# Patient Record
Sex: MALE | Race: BLACK OR AFRICAN AMERICAN | NOT HISPANIC OR LATINO
[De-identification: names, ages, dates, MRNs, and addresses within clinical notes are randomized per-mention and may not be internally consistent; named-entity substitution may affect disease eponyms.]

---

## 2018-03-08 ENCOUNTER — RESULT REVIEW (OUTPATIENT)
Age: 58
End: 2018-03-08

## 2019-03-14 ENCOUNTER — EMERGENCY (EMERGENCY)
Facility: HOSPITAL | Age: 59
LOS: 1 days | Discharge: HOME | End: 2019-03-14
Attending: EMERGENCY MEDICINE
Payer: COMMERCIAL

## 2019-03-14 VITALS
SYSTOLIC BLOOD PRESSURE: 149 MMHG | HEART RATE: 98 BPM | OXYGEN SATURATION: 98 % | RESPIRATION RATE: 20 BRPM | DIASTOLIC BLOOD PRESSURE: 91 MMHG | TEMPERATURE: 101 F

## 2019-03-14 PROCEDURE — 99284 EMERGENCY DEPT VISIT MOD MDM: CPT

## 2019-03-14 RX ORDER — ONDANSETRON 8 MG/1
4 TABLET, FILM COATED ORAL ONCE
Qty: 0 | Refills: 0 | Status: COMPLETED | OUTPATIENT
Start: 2019-03-14 | End: 2019-03-14

## 2019-03-14 RX ORDER — SODIUM CHLORIDE 9 MG/ML
1000 INJECTION INTRAMUSCULAR; INTRAVENOUS; SUBCUTANEOUS ONCE
Qty: 0 | Refills: 0 | Status: COMPLETED | OUTPATIENT
Start: 2019-03-14 | End: 2019-03-14

## 2019-03-14 RX ORDER — ACETAMINOPHEN 500 MG
975 TABLET ORAL ONCE
Qty: 0 | Refills: 0 | Status: COMPLETED | OUTPATIENT
Start: 2019-03-14 | End: 2019-03-14

## 2019-03-14 NOTE — ED ADULT TRIAGE NOTE - CHIEF COMPLAINT QUOTE
fever 102 pt refused any meds offered by family member fever 102 pt refused any meds offered by family member, pt laid down on the floor while waiting, able to ambulate w/o difficulty at triage

## 2019-03-15 VITALS
RESPIRATION RATE: 19 BRPM | HEART RATE: 85 BPM | SYSTOLIC BLOOD PRESSURE: 113 MMHG | TEMPERATURE: 99 F | OXYGEN SATURATION: 98 % | DIASTOLIC BLOOD PRESSURE: 67 MMHG

## 2019-03-15 DIAGNOSIS — Z98.890 OTHER SPECIFIED POSTPROCEDURAL STATES: Chronic | ICD-10-CM

## 2019-03-15 LAB
ALBUMIN SERPL ELPH-MCNC: 4.2 G/DL — SIGNIFICANT CHANGE UP (ref 3.3–5)
ALP SERPL-CCNC: 68 U/L — SIGNIFICANT CHANGE UP (ref 40–120)
ALT FLD-CCNC: 24 U/L — SIGNIFICANT CHANGE UP (ref 10–45)
ANION GAP SERPL CALC-SCNC: 12 MMOL/L — SIGNIFICANT CHANGE UP (ref 5–17)
AST SERPL-CCNC: 22 U/L — SIGNIFICANT CHANGE UP (ref 10–40)
BASOPHILS # BLD AUTO: 0 K/UL — SIGNIFICANT CHANGE UP (ref 0–0.2)
BASOPHILS NFR BLD AUTO: 0.1 % — SIGNIFICANT CHANGE UP (ref 0–2)
BILIRUB SERPL-MCNC: 0.4 MG/DL — SIGNIFICANT CHANGE UP (ref 0.2–1.2)
BUN SERPL-MCNC: 15 MG/DL — SIGNIFICANT CHANGE UP (ref 7–23)
CALCIUM SERPL-MCNC: 9.4 MG/DL — SIGNIFICANT CHANGE UP (ref 8.4–10.5)
CHLORIDE SERPL-SCNC: 100 MMOL/L — SIGNIFICANT CHANGE UP (ref 96–108)
CO2 SERPL-SCNC: 25 MMOL/L — SIGNIFICANT CHANGE UP (ref 22–31)
CREAT SERPL-MCNC: 1.11 MG/DL — SIGNIFICANT CHANGE UP (ref 0.5–1.3)
EOSINOPHIL # BLD AUTO: 0.1 K/UL — SIGNIFICANT CHANGE UP (ref 0–0.5)
EOSINOPHIL NFR BLD AUTO: 1.8 % — SIGNIFICANT CHANGE UP (ref 0–6)
GLUCOSE SERPL-MCNC: 94 MG/DL — SIGNIFICANT CHANGE UP (ref 70–99)
HCT VFR BLD CALC: 40.8 % — SIGNIFICANT CHANGE UP (ref 39–50)
HGB BLD-MCNC: 13.4 G/DL — SIGNIFICANT CHANGE UP (ref 13–17)
LYMPHOCYTES # BLD AUTO: 0.6 K/UL — LOW (ref 1–3.3)
LYMPHOCYTES # BLD AUTO: 7.8 % — LOW (ref 13–44)
MCHC RBC-ENTMCNC: 29.5 PG — SIGNIFICANT CHANGE UP (ref 27–34)
MCHC RBC-ENTMCNC: 32.9 GM/DL — SIGNIFICANT CHANGE UP (ref 32–36)
MCV RBC AUTO: 89.9 FL — SIGNIFICANT CHANGE UP (ref 80–100)
MONOCYTES # BLD AUTO: 0.5 K/UL — SIGNIFICANT CHANGE UP (ref 0–0.9)
MONOCYTES NFR BLD AUTO: 7.1 % — SIGNIFICANT CHANGE UP (ref 2–14)
NEUTROPHILS # BLD AUTO: 6 K/UL — SIGNIFICANT CHANGE UP (ref 1.8–7.4)
NEUTROPHILS NFR BLD AUTO: 83.2 % — HIGH (ref 43–77)
PLATELET # BLD AUTO: 255 K/UL — SIGNIFICANT CHANGE UP (ref 150–400)
POTASSIUM SERPL-MCNC: 4.1 MMOL/L — SIGNIFICANT CHANGE UP (ref 3.5–5.3)
POTASSIUM SERPL-SCNC: 4.1 MMOL/L — SIGNIFICANT CHANGE UP (ref 3.5–5.3)
PROT SERPL-MCNC: 7.1 G/DL — SIGNIFICANT CHANGE UP (ref 6–8.3)
RBC # BLD: 4.54 M/UL — SIGNIFICANT CHANGE UP (ref 4.2–5.8)
RBC # FLD: 13.5 % — SIGNIFICANT CHANGE UP (ref 10.3–14.5)
SODIUM SERPL-SCNC: 137 MMOL/L — SIGNIFICANT CHANGE UP (ref 135–145)
WBC # BLD: 7.2 K/UL — SIGNIFICANT CHANGE UP (ref 3.8–10.5)
WBC # FLD AUTO: 7.2 K/UL — SIGNIFICANT CHANGE UP (ref 3.8–10.5)

## 2019-03-15 PROCEDURE — 85027 COMPLETE CBC AUTOMATED: CPT

## 2019-03-15 PROCEDURE — 99284 EMERGENCY DEPT VISIT MOD MDM: CPT | Mod: 25

## 2019-03-15 PROCEDURE — 96374 THER/PROPH/DIAG INJ IV PUSH: CPT

## 2019-03-15 PROCEDURE — 80053 COMPREHEN METABOLIC PANEL: CPT

## 2019-03-15 RX ADMIN — SODIUM CHLORIDE 2000 MILLILITER(S): 9 INJECTION INTRAMUSCULAR; INTRAVENOUS; SUBCUTANEOUS at 00:08

## 2019-03-15 RX ADMIN — ONDANSETRON 4 MILLIGRAM(S): 8 TABLET, FILM COATED ORAL at 00:08

## 2019-03-15 RX ADMIN — Medication 975 MILLIGRAM(S): at 00:08

## 2019-03-15 NOTE — ED ADULT NURSE NOTE - OBJECTIVE STATEMENT
59 yr old male arrived to the ED from home c/o fever. HY. prostate CA, HTN, perforated diverticulitis w/ colostomy. 59 yr old male arrived to the ED from home c/o fever. HY. prostate CA, HTN, perforated diverticulitis w/ colostomy. per pt he has had a fever x12 with vomiting. pt endorses feeling weak and not eating or drinking. upon assessment pt is lethargic, but awake, refusing to lift his arm for blood pressure cuff application. lungs clear cassidy, abd is soft, non tender. skin WDL. wife at bedside

## 2019-03-15 NOTE — ED PROVIDER NOTE - NSFOLLOWUPINSTRUCTIONS_ED_ALL_ED_FT
Please follow-up with your primary doctor in 2-3 days, as needed      Fever  A fever is an increase in the body's temperature above 100.4°F (38°C) or higher. In adults and children older than three months, a brief mild or moderate fever generally has no long-term effect, and it usually does not require treatment. Many times, fevers are the result of viral infections, which are self-resolving.  However, certain symptoms or diagnostic tests may suggest a bacterial infection that may respond to antibiotics. Take medications as directed by your health care provider.    SEEK IMMEDIATE MEDICAL CARE IF YOU OR YOUR CHILD HAVE ANY OF THE FOLLOWING SYMPTOMS : shortness of breath, seizure, rash/stiff neck/headache, severe abdominal pain, persistent vomiting, any signs of dehydration, or if your child has a fever for over five (5) days.         Viral Respiratory Infection  A viral respiratory infection is an illness that affects parts of the body used for breathing, like the lungs, nose, and throat. It is caused by a germ called a virus. Symptoms can include runny nose, coughing, sneezing, fatigue, body aches, sore throat, fever, or headache. Over the counter medicine can be used to manage the symptoms but the infection typically goes away on its own in 5 to 10 days.     SEEK IMMEDIATE MEDICAL CARE IF YOU HAVE ANY OF THE FOLLOWING SYMPTOMS: shortness of breath, chest pain, fever over 10 days, or lightheadedness/dizziness.

## 2019-03-15 NOTE — ED PROVIDER NOTE - CLINICAL SUMMARY MEDICAL DECISION MAKING FREE TEXT BOX
[facility]averty PGY1- likely viral syndrome, ivf, tylenol, zofran, check basic labs,   PO challenge and d/c after meds Haverty PGY1- likely viral syndrome, ivf, tylenol, zofran, check basic labs,   PO challenge and d/c after meds    Angelina Ch MD - Attending Physician: Pt here with fever, myalgias. Likely viral syndrome. Exam benign. IVF, tylenol, recheck

## 2019-03-15 NOTE — ED PROVIDER NOTE - ATTENDING CONTRIBUTION TO CARE
Angelina Ch MD - Attending Physician: I have personally seen and examined this patient with the resident/fellow.  I have fully participated in the care of this patient. I have reviewed all pertinent clinical information, including history, physical exam, plan and the Resident/Fellow’s note and agree except as noted. See MDM

## 2019-03-15 NOTE — ED PROVIDER NOTE - PHYSICAL EXAMINATION
PHYSICAL EXAM:  GENERAL: midl distress, well-groomed, well-developed, conversant  HEAD:  Atraumatic, Normocephalic  EYES: EOMI, PERRLA, conjunctiva and sclera clear  ENMT: No tonsillar erythema, exudates, or enlargement; dry mucous membranes  NECK: Supple, No JVD, Normal thyroid  HEART: Regular rate and rhythm; No murmurs, rubs, or gallops  RESPIRATORY: CTA B/L, No W/R/R  ABDOMEN: Soft, Nontender, Nondistended, midline abdominal scar  NEURO: A&Ox3, nonfocal, moving all extremities  EXTREMITIES:  2+ Peripheral Pulses, No clubbing, cyanosis, or edema  SKIN: warm, dry, normal color, no rash or abnormal lesions

## 2019-03-15 NOTE — ED ADULT NURSE NOTE - CHIEF COMPLAINT QUOTE
fever 102 pt refused any meds offered by family member, pt laid down on the floor while waiting, able to ambulate w/o difficulty at triage

## 2019-03-15 NOTE — ED PROVIDER NOTE - OBJECTIVE STATEMENT
59 yoM, PMHx of HTN, prostate CA treated with cyber knife 2018, perfed diverticulitis with colostomy, s/p take down presents with 12 hours of fever, weakness, N/V, decreased PO and headache. Has not taken any Tylenol or ibuprofen at home for pain. No diarrhea or abd pain. No cough or recent illness. Denies vertigo but states when stood felt "off balance." Has lied in bed most day. No CP/SOB. Every day smoker. Social Etoh.

## 2019-08-13 ENCOUNTER — APPOINTMENT (OUTPATIENT)
Dept: SPINE | Facility: CLINIC | Age: 59
End: 2019-08-13
Payer: COMMERCIAL

## 2019-08-13 VITALS
WEIGHT: 205 LBS | DIASTOLIC BLOOD PRESSURE: 70 MMHG | HEIGHT: 73 IN | SYSTOLIC BLOOD PRESSURE: 109 MMHG | BODY MASS INDEX: 27.17 KG/M2 | HEART RATE: 71 BPM

## 2019-08-13 DIAGNOSIS — Z82.49 FAMILY HISTORY OF ISCHEMIC HEART DISEASE AND OTHER DISEASES OF THE CIRCULATORY SYSTEM: ICD-10-CM

## 2019-08-13 DIAGNOSIS — Z85.9 PERSONAL HISTORY OF MALIGNANT NEOPLASM, UNSPECIFIED: ICD-10-CM

## 2019-08-13 DIAGNOSIS — Z86.79 PERSONAL HISTORY OF OTHER DISEASES OF THE CIRCULATORY SYSTEM: ICD-10-CM

## 2019-08-13 DIAGNOSIS — Z78.9 OTHER SPECIFIED HEALTH STATUS: ICD-10-CM

## 2019-08-13 DIAGNOSIS — F17.200 NICOTINE DEPENDENCE, UNSPECIFIED, UNCOMPLICATED: ICD-10-CM

## 2019-08-13 PROCEDURE — 99244 OFF/OP CNSLTJ NEW/EST MOD 40: CPT

## 2019-08-13 RX ORDER — OMEGA-3/DHA/EPA/FISH OIL 300-1000MG
1000 CAPSULE ORAL
Refills: 0 | Status: ACTIVE | COMMUNITY

## 2019-08-13 RX ORDER — AMLODIPINE BESYLATE 5 MG/1
5 TABLET ORAL
Refills: 0 | Status: ACTIVE | COMMUNITY

## 2019-08-13 RX ORDER — MULTIVIT-MIN/FOLIC/VIT K/LYCOP 400-300MCG
50 MCG TABLET ORAL
Refills: 0 | Status: ACTIVE | COMMUNITY

## 2019-08-13 RX ORDER — LOSARTAN POTASSIUM 100 MG/1
100 TABLET, FILM COATED ORAL
Refills: 0 | Status: ACTIVE | COMMUNITY

## 2019-08-13 RX ORDER — FEXOFENADINE HYDROCHLORIDE AND PSEUDOEPHEDRINE HYDROCHLORIDE 180; 240 MG/1; MG/1
TABLET, FILM COATED, EXTENDED RELEASE ORAL
Refills: 0 | Status: ACTIVE | COMMUNITY

## 2019-08-13 RX ORDER — ASCORBIC ACID 500 MG
500 TABLET ORAL
Refills: 0 | Status: ACTIVE | COMMUNITY

## 2019-08-19 ENCOUNTER — RX RENEWAL (OUTPATIENT)
Age: 59
End: 2019-08-19

## 2019-08-21 ENCOUNTER — APPOINTMENT (OUTPATIENT)
Dept: OTOLARYNGOLOGY | Facility: CLINIC | Age: 59
End: 2019-08-21
Payer: COMMERCIAL

## 2019-08-21 VITALS
WEIGHT: 205 LBS | BODY MASS INDEX: 27.17 KG/M2 | HEART RATE: 71 BPM | HEIGHT: 73 IN | DIASTOLIC BLOOD PRESSURE: 67 MMHG | SYSTOLIC BLOOD PRESSURE: 107 MMHG

## 2019-08-21 PROCEDURE — 31231 NASAL ENDOSCOPY DX: CPT

## 2019-08-21 PROCEDURE — 99204 OFFICE O/P NEW MOD 45 MIN: CPT | Mod: 25

## 2019-08-21 NOTE — ASSESSMENT
[FreeTextEntry1] : Pituitary Macroadenoma:\par - discussed my role in the surgery including the nasal and sinus procedures that I will be performing in order to provide the neurosurgeon access to the pituitary tumor\par - expected post-op hospital course discussed including need for possible repair of spinal fluid leak and close observation for 2-3 days\par - post-op care consisting of nasal saline irrigations was reviewed, and [facility] sinus pamphlet was given to patient to obtain prior to surgery so they have ready to use on discharge home\par - discussed post-operative issues including nasal congestion, loss of sense of smell which should be temporary but can be permanent, bloody nasal drainage, facial pressure/headaches, bleeding, and infection\par - all questions answered and patient will call if any further concerns\par - plan to see patient on AM of surgery \par \par LPRD:\par - recommend reflux precautions and H2 blocker at night\par \par Chronic maxillary sinusitis:\par - opacification noted on PET 2018 and some sinus disease noted on MRI 7/2019 so will view sinuses on MRI he is having preop and will address other sinuses at time of surgery if needed

## 2019-08-21 NOTE — REASON FOR VISIT
[Initial Consultation] : an initial consultation for [FreeTextEntry2] : Going for TSPR with Dr. Rodriguez 9/26/19

## 2019-08-21 NOTE — CONSULT LETTER
[Dear  ___] : Dear  [unfilled], [Consult Letter:] : I had the pleasure of evaluating your patient, [unfilled]. [Consult Closing:] : Thank you very much for allowing me to participate in the care of this patient.  If you have any questions, please do not hesitate to contact me. [Please see my note below.] : Please see my note below. [Sincerely,] : Sincerely, [FreeTextEntry3] : Beulah Romeo MD\par Otolaryngology and Cranial Base Surgery\par Attending Physician - Department of Otolaryngology and Head & Neck Surgery\par [facility]\par  - [employer] [facility]\par Office: [phone]\par Fax: [phone]\par

## 2019-08-21 NOTE — PROCEDURE
[FreeTextEntry6] : Afrin and lidocaine were topically sprayed. Flexible scope #2 was used. Right nasal passage with normal inferior, middle and superior turbinates. Nasal passage narrowed by septal spur and unable to pass scope further back. Left nasal passage with normal inferior, middle and superior turbinates. Nasal passage was narrowed by septal deviation with clear middle meatus and sphenoethmoid recess. No mucopurulence or polyps appreciated. Nasopharynx clear.  [de-identified] : Afrin 0.05% and lidocaine 4% sprayed into both nasal passages. Flexible scope #2 used. Passed through nasal passage and nasopharynx/oropharynx/hypopharynx clear. Supraglottis normal. Glottis with fully mobile vocal cords without lesions or masses. Visualized subglottis clear. Postcricoid area with mild erythema but no edema. No pooling of secretions.

## 2019-08-21 NOTE — PHYSICAL EXAM
[Midline] : trachea located in midline position [Normal] : no rashes [de-identified] : well healed right parotid incision scar

## 2019-09-12 ENCOUNTER — OUTPATIENT (OUTPATIENT)
Dept: OUTPATIENT SERVICES | Facility: HOSPITAL | Age: 59
LOS: 1 days | End: 2019-09-12
Payer: COMMERCIAL

## 2019-09-12 VITALS
RESPIRATION RATE: 16 BRPM | SYSTOLIC BLOOD PRESSURE: 112 MMHG | HEIGHT: 73 IN | TEMPERATURE: 98 F | OXYGEN SATURATION: 98 % | HEART RATE: 67 BPM | DIASTOLIC BLOOD PRESSURE: 63 MMHG | WEIGHT: 207.9 LBS

## 2019-09-12 DIAGNOSIS — Z87.828 PERSONAL HISTORY OF OTHER (HEALED) PHYSICAL INJURY AND TRAUMA: Chronic | ICD-10-CM

## 2019-09-12 DIAGNOSIS — D35.2 BENIGN NEOPLASM OF PITUITARY GLAND: ICD-10-CM

## 2019-09-12 DIAGNOSIS — I10 ESSENTIAL (PRIMARY) HYPERTENSION: ICD-10-CM

## 2019-09-12 DIAGNOSIS — Z98.890 OTHER SPECIFIED POSTPROCEDURAL STATES: Chronic | ICD-10-CM

## 2019-09-12 DIAGNOSIS — D11.0 BENIGN NEOPLASM OF PAROTID GLAND: Chronic | ICD-10-CM

## 2019-09-12 DIAGNOSIS — Z29.9 ENCOUNTER FOR PROPHYLACTIC MEASURES, UNSPECIFIED: ICD-10-CM

## 2019-09-12 DIAGNOSIS — D49.4 NEOPLASM OF UNSPECIFIED BEHAVIOR OF BLADDER: Chronic | ICD-10-CM

## 2019-09-12 DIAGNOSIS — G47.30 SLEEP APNEA, UNSPECIFIED: ICD-10-CM

## 2019-09-12 DIAGNOSIS — Z01.818 ENCOUNTER FOR OTHER PREPROCEDURAL EXAMINATION: ICD-10-CM

## 2019-09-12 LAB
ANION GAP SERPL CALC-SCNC: 13 MMOL/L — SIGNIFICANT CHANGE UP (ref 5–17)
BUN SERPL-MCNC: 9 MG/DL — SIGNIFICANT CHANGE UP (ref 7–23)
CALCIUM SERPL-MCNC: 9.3 MG/DL — SIGNIFICANT CHANGE UP (ref 8.4–10.5)
CHLORIDE SERPL-SCNC: 104 MMOL/L — SIGNIFICANT CHANGE UP (ref 96–108)
CO2 SERPL-SCNC: 24 MMOL/L — SIGNIFICANT CHANGE UP (ref 22–31)
CREAT SERPL-MCNC: 0.92 MG/DL — SIGNIFICANT CHANGE UP (ref 0.5–1.3)
GLUCOSE SERPL-MCNC: 80 MG/DL — SIGNIFICANT CHANGE UP (ref 70–99)
HCT VFR BLD CALC: 36.7 % — LOW (ref 39–50)
HGB BLD-MCNC: 11.8 G/DL — LOW (ref 13–17)
MCHC RBC-ENTMCNC: 28.9 PG — SIGNIFICANT CHANGE UP (ref 27–34)
MCHC RBC-ENTMCNC: 32.2 GM/DL — SIGNIFICANT CHANGE UP (ref 32–36)
MCV RBC AUTO: 89.7 FL — SIGNIFICANT CHANGE UP (ref 80–100)
PLATELET # BLD AUTO: 313 K/UL — SIGNIFICANT CHANGE UP (ref 150–400)
POTASSIUM SERPL-MCNC: 4.1 MMOL/L — SIGNIFICANT CHANGE UP (ref 3.5–5.3)
POTASSIUM SERPL-SCNC: 4.1 MMOL/L — SIGNIFICANT CHANGE UP (ref 3.5–5.3)
RBC # BLD: 4.09 M/UL — LOW (ref 4.2–5.8)
RBC # FLD: 14.1 % — SIGNIFICANT CHANGE UP (ref 10.3–14.5)
SODIUM SERPL-SCNC: 141 MMOL/L — SIGNIFICANT CHANGE UP (ref 135–145)
WBC # BLD: 4.86 K/UL — SIGNIFICANT CHANGE UP (ref 3.8–10.5)
WBC # FLD AUTO: 4.86 K/UL — SIGNIFICANT CHANGE UP (ref 3.8–10.5)

## 2019-09-12 PROCEDURE — 80048 BASIC METABOLIC PNL TOTAL CA: CPT

## 2019-09-12 PROCEDURE — G0463: CPT

## 2019-09-12 PROCEDURE — 85027 COMPLETE CBC AUTOMATED: CPT

## 2019-09-12 RX ORDER — CEFAZOLIN SODIUM 1 G
2000 VIAL (EA) INJECTION ONCE
Refills: 0 | Status: DISCONTINUED | OUTPATIENT
Start: 2019-09-26 | End: 2019-09-27

## 2019-09-12 NOTE — H&P PST ADULT - GASTROINTESTINAL COMMENTS
Surgical scar well healed abdominal area lower hx constipation chronic hx diverticulitis has colon resection 2008 been stable since

## 2019-09-12 NOTE — H&P PST ADULT - NSICDXPASTSURGICALHX_GEN_ALL_CORE_FT
PAST SURGICAL HISTORY:  Benign parotid tumor excision 2009    Bladder tumor excision  2014    H/O arthroscopy of knee right knee 1995    History of colostomy reversal 2009 /colon resection 2008    History of torn meniscus of left knee 1995

## 2019-09-12 NOTE — H&P PST ADULT - NSICDXPROBLEM_GEN_ALL_CORE_FT
PROBLEM DIAGNOSES  Problem: Benign neoplasm of pituitary gland  Assessment and Plan: Endoscopic transphenoidal removal of pituitary tumor, PSt instruction given , CBC/BMP drawn sent pending result has own appointment with PMD next week for preop medical evaluation    Problem: Sleep apnea  Assessment and Plan: OR booking called for MATEO precaution    Problem: Prophylactic measure  Assessment and Plan: The Caprini score indicates this patient is at risk for a VTE event (score 3-5).  Most surgical patients in this group would benefit from pharmacologic prophylaxis.  The surgical team will determine the balance between VTE risk and bleeding risk      Problem: Hypertension  Assessment and Plan: to continue taking blood pressure medication regularly and on DOS   Patient on asa for prophylaxis will d/c asa/gingko  7 days preop as instructed by PMD PROBLEM DIAGNOSES  Problem: Benign neoplasm of pituitary gland  Assessment and Plan: Endoscopic transphenoidal removal of pituitary tumor, PSt instruction given , CBC/BMP drawn sent pending result has own appointment with PMD next week for preop medical evaluation    Problem: Sleep apnea  Assessment and Plan: OR booking called for MATEO precaution will bring own oral device     Problem: Prophylactic measure  Assessment and Plan: The Caprini score indicates this patient is at risk for a VTE event (score 3-5).  Most surgical patients in this group would benefit from pharmacologic prophylaxis.  The surgical team will determine the balance between VTE risk and bleeding risk      Problem: Hypertension  Assessment and Plan: to continue taking blood pressure medication regularly and on DOS   Patient on asa for prophylaxis will d/c asa/gingko  7 days preop as instructed by PMD

## 2019-09-12 NOTE — H&P PST ADULT - ASSESSMENT
CAPRINI SCORE [CLOT]    AGE RELATED RISK FACTORS                                                       MOBILITY RELATED FACTORS  [x ] Age 41-60 years                                            (1 Point)                  [ ] Bed rest                                                        (1 Point)  [ ] Age: 61-74 years                                           (2 Points)                 [ ] Plaster cast                                                   (2 Points)  [ ] Age= 75 years                                              (3 Points)                 [ ] Bed bound for more than 72 hours                 (2 Points)    DISEASE RELATED RISK FACTORS                                               GENDER SPECIFIC FACTORS  [ ] Edema in the lower extremities                       (1 Point)                  [ ] Pregnancy                                                     (1 Point)  [ ] Varicose veins                                               (1 Point)                  [ ] Post-partum < 6 weeks                                   (1 Point)             [ x] BMI > 25 Kg/m2                                            (1 Point)                  [ ] Hormonal therapy  or oral contraception          (1 Point)                 [ ] Sepsis (in the previous month)                        (1 Point)                  [ ] History of pregnancy complications                 (1 point)  [ ] Pneumonia or serious lung disease                                               [ ] Unexplained or recurrent                     (1 Point)           (in the previous month)                               (1 Point)  [ ] Abnormal pulmonary function test                     (1 Point)                 SURGERY RELATED RISK FACTORS  [ ] Acute myocardial infarction                              (1 Point)                 [ ]  Section                                             (1 Point)  [ ] Congestive heart failure (in the previous month)  (1 Point)               [ ] Minor surgery                                                  (1 Point)   [ ] Inflammatory bowel disease                             (1 Point)                 [ ] Arthroscopic surgery                                        (2 Points)  [ ] Central venous access                                      (2 Points)                [ x] General surgery lasting more than 45 minutes   (2 Points)       [ ] Stroke (in the previous month)                          (5 Points)               [ ] Elective arthroplasty                                         (5 Points)                                                                                                                                               HEMATOLOGY RELATED FACTORS                                                 TRAUMA RELATED RISK FACTORS  [ ] Prior episodes of VTE                                     (3 Points)                [ ] Fracture of the hip, pelvis, or leg                       (5 Points)  [ ] Positive family history for VTE                         (3 Points)                 [ ] Acute spinal cord injury (in the previous month)  (5 Points)  [ ] Prothrombin 18248 A                                     (3 Points)                 [ ] Paralysis  (less than 1 month)                             (5 Points)  [ ] Factor V Leiden                                             (3 Points)                  [ ] Multiple Trauma within 1 month                        (5 Points)  [ ] Lupus anticoagulants                                     (3 Points)                                                           [ ] Anticardiolipin antibodies                               (3 Points)                                                       [ ] High homocysteine in the blood                      (3 Points)                                             [ ] Other congenital or acquired thrombophilia      (3 Points)                                                [ ] Heparin induced thrombocytopenia                  (3 Points)                                          Total Score [   4       ]    Caprini Score 0 - 2:  Low Risk, No VTE Prophylaxis required for most patients, encourage ambulation  Caprini Score 3 - 6:  At Risk, pharmacologic VTE prophylaxis is indicated for most patients (in the absence of a contraindication)  Caprini Score Greater than or = 7:  High Risk, pharmacologic VTE prophylaxis is indicated for most patients (in the absence of a contraindication)

## 2019-09-12 NOTE — H&P PST ADULT - NSICDXPASTMEDICALHX_GEN_ALL_CORE_FT
PAST MEDICAL HISTORY:  Acute diverticulitis hx perforation 2008 had colon resection with colostomy and reversal 2009    Hypertension on medication    Prostate cancer had cyber knife treatment on remission 2018    Sleep apnea uses oral device x 10 years

## 2019-09-12 NOTE — H&P PST ADULT - HISTORY OF PRESENT ILLNESS
59 year old male with hx of htn, sleep apnea, prostate cancer 2018 on remission. Patient came in for PSt today for endoscopic transphenoidal removal of pituitary tumor. Patient went to see ophthalmologist due to vision disorder. Patient  examined  sent for xray which was positive for pituitary tumor. Patient  was referred to Dr Rodriguez, evaluated and now  scheduled this procedure for treatment.

## 2019-09-25 ENCOUNTER — TRANSCRIPTION ENCOUNTER (OUTPATIENT)
Age: 59
End: 2019-09-25

## 2019-09-26 ENCOUNTER — RESULT REVIEW (OUTPATIENT)
Age: 59
End: 2019-09-26

## 2019-09-26 ENCOUNTER — TRANSCRIPTION ENCOUNTER (OUTPATIENT)
Age: 59
End: 2019-09-26

## 2019-09-26 ENCOUNTER — APPOINTMENT (OUTPATIENT)
Dept: MRI IMAGING | Facility: HOSPITAL | Age: 59
End: 2019-09-26

## 2019-09-26 ENCOUNTER — APPOINTMENT (OUTPATIENT)
Dept: CT IMAGING | Facility: HOSPITAL | Age: 59
End: 2019-09-26

## 2019-09-26 ENCOUNTER — INPATIENT (INPATIENT)
Facility: HOSPITAL | Age: 59
LOS: 3 days | Discharge: HOME | DRG: 614 | End: 2019-09-30
Attending: NEUROLOGICAL SURGERY | Admitting: NEUROLOGICAL SURGERY
Payer: COMMERCIAL

## 2019-09-26 ENCOUNTER — APPOINTMENT (OUTPATIENT)
Dept: OTOLARYNGOLOGY | Facility: HOSPITAL | Age: 59
End: 2019-09-26

## 2019-09-26 ENCOUNTER — APPOINTMENT (OUTPATIENT)
Dept: SPINE | Facility: HOSPITAL | Age: 59
End: 2019-09-26

## 2019-09-26 VITALS
RESPIRATION RATE: 18 BRPM | HEIGHT: 73 IN | SYSTOLIC BLOOD PRESSURE: 113 MMHG | TEMPERATURE: 98 F | HEART RATE: 68 BPM | DIASTOLIC BLOOD PRESSURE: 72 MMHG | WEIGHT: 207.9 LBS | OXYGEN SATURATION: 100 %

## 2019-09-26 DIAGNOSIS — Z98.890 OTHER SPECIFIED POSTPROCEDURAL STATES: Chronic | ICD-10-CM

## 2019-09-26 DIAGNOSIS — D11.0 BENIGN NEOPLASM OF PAROTID GLAND: Chronic | ICD-10-CM

## 2019-09-26 DIAGNOSIS — D35.2 BENIGN NEOPLASM OF PITUITARY GLAND: ICD-10-CM

## 2019-09-26 DIAGNOSIS — Z87.828 PERSONAL HISTORY OF OTHER (HEALED) PHYSICAL INJURY AND TRAUMA: Chronic | ICD-10-CM

## 2019-09-26 DIAGNOSIS — D49.4 NEOPLASM OF UNSPECIFIED BEHAVIOR OF BLADDER: Chronic | ICD-10-CM

## 2019-09-26 LAB
ANION GAP SERPL CALC-SCNC: 12 MMOL/L — SIGNIFICANT CHANGE UP (ref 5–17)
BLD GP AB SCN SERPL QL: NEGATIVE — SIGNIFICANT CHANGE UP
BUN SERPL-MCNC: 9 MG/DL — SIGNIFICANT CHANGE UP (ref 7–23)
CALCIUM SERPL-MCNC: 9 MG/DL — SIGNIFICANT CHANGE UP (ref 8.4–10.5)
CHLORIDE SERPL-SCNC: 102 MMOL/L — SIGNIFICANT CHANGE UP (ref 96–108)
CO2 SERPL-SCNC: 23 MMOL/L — SIGNIFICANT CHANGE UP (ref 22–31)
CREAT SERPL-MCNC: 0.74 MG/DL — SIGNIFICANT CHANGE UP (ref 0.5–1.3)
GLUCOSE SERPL-MCNC: 154 MG/DL — HIGH (ref 70–99)
HCT VFR BLD CALC: 38 % — LOW (ref 39–50)
HGB BLD-MCNC: 13 G/DL — SIGNIFICANT CHANGE UP (ref 13–17)
MAGNESIUM SERPL-MCNC: 1.8 MG/DL — SIGNIFICANT CHANGE UP (ref 1.6–2.6)
MCHC RBC-ENTMCNC: 30.6 PG — SIGNIFICANT CHANGE UP (ref 27–34)
MCHC RBC-ENTMCNC: 34.1 GM/DL — SIGNIFICANT CHANGE UP (ref 32–36)
MCV RBC AUTO: 89.6 FL — SIGNIFICANT CHANGE UP (ref 80–100)
PHOSPHATE SERPL-MCNC: 2.5 MG/DL — SIGNIFICANT CHANGE UP (ref 2.5–4.5)
PLATELET # BLD AUTO: 273 K/UL — SIGNIFICANT CHANGE UP (ref 150–400)
POTASSIUM SERPL-MCNC: 3.7 MMOL/L — SIGNIFICANT CHANGE UP (ref 3.5–5.3)
POTASSIUM SERPL-SCNC: 3.7 MMOL/L — SIGNIFICANT CHANGE UP (ref 3.5–5.3)
RBC # BLD: 4.24 M/UL — SIGNIFICANT CHANGE UP (ref 4.2–5.8)
RBC # FLD: 13.7 % — SIGNIFICANT CHANGE UP (ref 10.3–14.5)
RH IG SCN BLD-IMP: POSITIVE — SIGNIFICANT CHANGE UP
SODIUM SERPL-SCNC: 137 MMOL/L — SIGNIFICANT CHANGE UP (ref 135–145)
WBC # BLD: 14.6 K/UL — HIGH (ref 3.8–10.5)
WBC # FLD AUTO: 14.6 K/UL — HIGH (ref 3.8–10.5)

## 2019-09-26 PROCEDURE — 88360 TUMOR IMMUNOHISTOCHEM/MANUAL: CPT | Mod: 26

## 2019-09-26 PROCEDURE — 99291 CRITICAL CARE FIRST HOUR: CPT

## 2019-09-26 PROCEDURE — 88341 IMHCHEM/IMCYTCHM EA ADD ANTB: CPT | Mod: 26,59

## 2019-09-26 PROCEDURE — 99233 SBSQ HOSP IP/OBS HIGH 50: CPT

## 2019-09-26 PROCEDURE — 99292 CRITICAL CARE ADDL 30 MIN: CPT

## 2019-09-26 PROCEDURE — 70553 MRI BRAIN STEM W/O & W/DYE: CPT | Mod: 26

## 2019-09-26 PROCEDURE — 61782 SCAN PROC CRANIAL EXTRA: CPT

## 2019-09-26 PROCEDURE — 70450 CT HEAD/BRAIN W/O DYE: CPT | Mod: 26

## 2019-09-26 PROCEDURE — 62165 REMOVE PITUIT TUMOR W/SCOPE: CPT | Mod: 62

## 2019-09-26 PROCEDURE — 88305 TISSUE EXAM BY PATHOLOGIST: CPT | Mod: 26

## 2019-09-26 PROCEDURE — 88342 IMHCHEM/IMCYTCHM 1ST ANTB: CPT | Mod: 26,59

## 2019-09-26 PROCEDURE — 20926: CPT

## 2019-09-26 PROCEDURE — 61781 SCAN PROC CRANIAL INTRA: CPT

## 2019-09-26 RX ORDER — ONDANSETRON 8 MG/1
4 TABLET, FILM COATED ORAL EVERY 6 HOURS
Refills: 0 | Status: DISCONTINUED | OUTPATIENT
Start: 2019-09-26 | End: 2019-09-30

## 2019-09-26 RX ORDER — FENTANYL CITRATE 50 UG/ML
50 INJECTION INTRAVENOUS
Refills: 0 | Status: DISCONTINUED | OUTPATIENT
Start: 2019-09-26 | End: 2019-09-26

## 2019-09-26 RX ORDER — TAMSULOSIN HYDROCHLORIDE 0.4 MG/1
0 CAPSULE ORAL
Qty: 0 | Refills: 0 | DISCHARGE

## 2019-09-26 RX ORDER — LOSARTAN POTASSIUM 100 MG/1
100 TABLET, FILM COATED ORAL DAILY
Refills: 0 | Status: DISCONTINUED | OUTPATIENT
Start: 2019-09-26 | End: 2019-09-26

## 2019-09-26 RX ORDER — SODIUM CHLORIDE 9 MG/ML
3 INJECTION INTRAMUSCULAR; INTRAVENOUS; SUBCUTANEOUS EVERY 8 HOURS
Refills: 0 | Status: DISCONTINUED | OUTPATIENT
Start: 2019-09-26 | End: 2019-09-26

## 2019-09-26 RX ORDER — SENNA PLUS 8.6 MG/1
2 TABLET ORAL AT BEDTIME
Refills: 0 | Status: DISCONTINUED | OUTPATIENT
Start: 2019-09-26 | End: 2019-09-30

## 2019-09-26 RX ORDER — AMLODIPINE BESYLATE 2.5 MG/1
5 TABLET ORAL DAILY
Refills: 0 | Status: DISCONTINUED | OUTPATIENT
Start: 2019-09-26 | End: 2019-09-26

## 2019-09-26 RX ORDER — LORATADINE 10 MG/1
10 TABLET ORAL DAILY
Refills: 0 | Status: DISCONTINUED | OUTPATIENT
Start: 2019-09-26 | End: 2019-09-30

## 2019-09-26 RX ORDER — AMLODIPINE BESYLATE 2.5 MG/1
1 TABLET ORAL
Qty: 0 | Refills: 0 | DISCHARGE

## 2019-09-26 RX ORDER — NICARDIPINE HYDROCHLORIDE 30 MG/1
5 CAPSULE, EXTENDED RELEASE ORAL
Qty: 40 | Refills: 0 | Status: DISCONTINUED | OUTPATIENT
Start: 2019-09-26 | End: 2019-09-26

## 2019-09-26 RX ORDER — ACETAMINOPHEN 500 MG
1000 TABLET ORAL ONCE
Refills: 0 | Status: COMPLETED | OUTPATIENT
Start: 2019-09-26 | End: 2019-09-26

## 2019-09-26 RX ORDER — ONDANSETRON 8 MG/1
4 TABLET, FILM COATED ORAL ONCE
Refills: 0 | Status: COMPLETED | OUTPATIENT
Start: 2019-09-26 | End: 2019-09-26

## 2019-09-26 RX ORDER — LOSARTAN POTASSIUM 100 MG/1
100 TABLET, FILM COATED ORAL DAILY
Refills: 0 | Status: DISCONTINUED | OUTPATIENT
Start: 2019-09-26 | End: 2019-09-30

## 2019-09-26 RX ORDER — TRAMADOL HYDROCHLORIDE 50 MG/1
50 TABLET ORAL ONCE
Refills: 0 | Status: DISCONTINUED | OUTPATIENT
Start: 2019-09-26 | End: 2019-09-26

## 2019-09-26 RX ORDER — DOCUSATE SODIUM 100 MG
100 CAPSULE ORAL
Refills: 0 | Status: DISCONTINUED | OUTPATIENT
Start: 2019-09-26 | End: 2019-09-30

## 2019-09-26 RX ORDER — OXYCODONE AND ACETAMINOPHEN 5; 325 MG/1; MG/1
1 TABLET ORAL EVERY 4 HOURS
Refills: 0 | Status: DISCONTINUED | OUTPATIENT
Start: 2019-09-26 | End: 2019-09-27

## 2019-09-26 RX ORDER — CHLORHEXIDINE GLUCONATE 213 G/1000ML
1 SOLUTION TOPICAL ONCE
Refills: 0 | Status: DISCONTINUED | OUTPATIENT
Start: 2019-09-26 | End: 2019-09-26

## 2019-09-26 RX ORDER — CHLORHEXIDINE GLUCONATE 213 G/1000ML
1 SOLUTION TOPICAL
Refills: 0 | Status: DISCONTINUED | OUTPATIENT
Start: 2019-09-26 | End: 2019-09-27

## 2019-09-26 RX ORDER — FENTANYL CITRATE 50 UG/ML
25 INJECTION INTRAVENOUS
Refills: 0 | Status: DISCONTINUED | OUTPATIENT
Start: 2019-09-26 | End: 2019-09-26

## 2019-09-26 RX ORDER — POTASSIUM CHLORIDE 20 MEQ
20 PACKET (EA) ORAL ONCE
Refills: 0 | Status: COMPLETED | OUTPATIENT
Start: 2019-09-26 | End: 2019-09-26

## 2019-09-26 RX ORDER — LOSARTAN POTASSIUM 100 MG/1
1 TABLET, FILM COATED ORAL
Qty: 0 | Refills: 0 | DISCHARGE

## 2019-09-26 RX ORDER — TAMSULOSIN HYDROCHLORIDE 0.4 MG/1
0.4 CAPSULE ORAL AT BEDTIME
Refills: 0 | Status: DISCONTINUED | OUTPATIENT
Start: 2019-09-26 | End: 2019-09-30

## 2019-09-26 RX ORDER — LIDOCAINE HCL 20 MG/ML
0.2 VIAL (ML) INJECTION ONCE
Refills: 0 | Status: DISCONTINUED | OUTPATIENT
Start: 2019-09-26 | End: 2019-09-26

## 2019-09-26 RX ORDER — CEFAZOLIN SODIUM 1 G
2000 VIAL (EA) INJECTION EVERY 8 HOURS
Refills: 0 | Status: DISCONTINUED | OUTPATIENT
Start: 2019-09-26 | End: 2019-09-27

## 2019-09-26 RX ORDER — METOCLOPRAMIDE HCL 10 MG
10 TABLET ORAL ONCE
Refills: 0 | Status: COMPLETED | OUTPATIENT
Start: 2019-09-26 | End: 2019-09-26

## 2019-09-26 RX ORDER — FENTANYL CITRATE 50 UG/ML
25 INJECTION INTRAVENOUS ONCE
Refills: 0 | Status: DISCONTINUED | OUTPATIENT
Start: 2019-09-26 | End: 2019-09-26

## 2019-09-26 RX ORDER — FEXOFENADINE HCL 30 MG
0 TABLET ORAL
Qty: 0 | Refills: 0 | DISCHARGE

## 2019-09-26 RX ORDER — NICARDIPINE HYDROCHLORIDE 30 MG/1
5 CAPSULE, EXTENDED RELEASE ORAL
Qty: 40 | Refills: 0 | Status: DISCONTINUED | OUTPATIENT
Start: 2019-09-26 | End: 2019-09-27

## 2019-09-26 RX ORDER — MAGNESIUM SULFATE 500 MG/ML
2 VIAL (ML) INJECTION ONCE
Refills: 0 | Status: COMPLETED | OUTPATIENT
Start: 2019-09-26 | End: 2019-09-26

## 2019-09-26 RX ORDER — SODIUM CHLORIDE 9 MG/ML
1000 INJECTION INTRAMUSCULAR; INTRAVENOUS; SUBCUTANEOUS
Refills: 0 | Status: DISCONTINUED | OUTPATIENT
Start: 2019-09-26 | End: 2019-09-27

## 2019-09-26 RX ORDER — AMLODIPINE BESYLATE 2.5 MG/1
5 TABLET ORAL DAILY
Refills: 0 | Status: DISCONTINUED | OUTPATIENT
Start: 2019-09-26 | End: 2019-09-30

## 2019-09-26 RX ORDER — ONDANSETRON 8 MG/1
4 TABLET, FILM COATED ORAL EVERY 4 HOURS
Refills: 0 | Status: DISCONTINUED | OUTPATIENT
Start: 2019-09-26 | End: 2019-09-26

## 2019-09-26 RX ORDER — LABETALOL HCL 100 MG
10 TABLET ORAL ONCE
Refills: 0 | Status: COMPLETED | OUTPATIENT
Start: 2019-09-26 | End: 2019-09-26

## 2019-09-26 RX ADMIN — Medication 400 MILLIGRAM(S): at 15:45

## 2019-09-26 RX ADMIN — OXYCODONE AND ACETAMINOPHEN 1 TABLET(S): 5; 325 TABLET ORAL at 21:38

## 2019-09-26 RX ADMIN — NICARDIPINE HYDROCHLORIDE 25 MG/HR: 30 CAPSULE, EXTENDED RELEASE ORAL at 21:00

## 2019-09-26 RX ADMIN — TRAMADOL HYDROCHLORIDE 50 MILLIGRAM(S): 50 TABLET ORAL at 23:40

## 2019-09-26 RX ADMIN — SODIUM CHLORIDE 75 MILLILITER(S): 9 INJECTION INTRAMUSCULAR; INTRAVENOUS; SUBCUTANEOUS at 20:44

## 2019-09-26 RX ADMIN — ONDANSETRON 4 MILLIGRAM(S): 8 TABLET, FILM COATED ORAL at 15:45

## 2019-09-26 RX ADMIN — TAMSULOSIN HYDROCHLORIDE 0.4 MILLIGRAM(S): 0.4 CAPSULE ORAL at 21:40

## 2019-09-26 RX ADMIN — Medication 50 GRAM(S): at 23:40

## 2019-09-26 RX ADMIN — FENTANYL CITRATE 25 MICROGRAM(S): 50 INJECTION INTRAVENOUS at 16:15

## 2019-09-26 RX ADMIN — CHLORHEXIDINE GLUCONATE 1 APPLICATION(S): 213 SOLUTION TOPICAL at 21:47

## 2019-09-26 RX ADMIN — SENNA PLUS 2 TABLET(S): 8.6 TABLET ORAL at 21:47

## 2019-09-26 RX ADMIN — FENTANYL CITRATE 25 MICROGRAM(S): 50 INJECTION INTRAVENOUS at 16:30

## 2019-09-26 RX ADMIN — Medication 10 MILLIGRAM(S): at 16:00

## 2019-09-26 RX ADMIN — Medication 20 MILLIEQUIVALENT(S): at 23:40

## 2019-09-26 RX ADMIN — NICARDIPINE HYDROCHLORIDE 25 MG/HR: 30 CAPSULE, EXTENDED RELEASE ORAL at 20:00

## 2019-09-26 RX ADMIN — OXYCODONE AND ACETAMINOPHEN 1 TABLET(S): 5; 325 TABLET ORAL at 22:08

## 2019-09-26 RX ADMIN — Medication 1000 MILLIGRAM(S): at 16:15

## 2019-09-26 RX ADMIN — Medication 100 MILLIGRAM(S): at 21:46

## 2019-09-26 RX ADMIN — NICARDIPINE HYDROCHLORIDE 25 MG/HR: 30 CAPSULE, EXTENDED RELEASE ORAL at 23:30

## 2019-09-26 RX ADMIN — OXYCODONE AND ACETAMINOPHEN 1 TABLET(S): 5; 325 TABLET ORAL at 18:35

## 2019-09-26 RX ADMIN — Medication 10 MILLIGRAM(S): at 15:40

## 2019-09-26 RX ADMIN — OXYCODONE AND ACETAMINOPHEN 1 TABLET(S): 5; 325 TABLET ORAL at 18:05

## 2019-09-26 NOTE — CHART NOTE - NSCHARTNOTEFT_GEN_A_CORE
CAPRINI SCORE [CLOT] Score on Admission for     AGE RELATED RISK FACTORS                                                       MOBILITY RELATED FACTORS  [x ] Age 41-60 years                                            (1 Point)                  [ ] Bed rest                                                        (1 Point)  [ ] Age: 61-74 years                                           (2 Points)                 [ ] Plaster cast                                                   (2 Points)  [ ] Age= 75 years                                              (3 Points)                 [ ] Bed bound for more than 72 hours                 (2 Points)    DISEASE RELATED RISK FACTORS                                               GENDER SPECIFIC FACTORS  [ ] Edema in the lower extremities                       (1 Point)                  [ ] Pregnancy                                                     (1 Point)  [ ] Varicose veins                                               (1 Point)                  [ ] Post-partum < 6 weeks                                   (1 Point)             [x ] BMI > 25 Kg/m2                                            (1 Point)                  [ ] Hormonal therapy  or oral contraception          (1 Point)                 [ ] Sepsis (in the previous month)                        (1 Point)                  [ ] History of pregnancy complications                 (1 point)  [ ] Pneumonia or serious lung disease                                               [ ] Unexplained or recurrent                     (1 Point)           (in the previous month)                               (1 Point)  [ ] Abnormal pulmonary function test                     (1 Point)                 SURGERY RELATED RISK FACTORS (include planned surgeries)  [ ] Acute myocardial infarction                              (1 Point)                 [ ]  Section                                             (1 Point)  [ ] Congestive heart failure (in the previous month)  (1 Point)         [ ] Minor surgery                                                  (1 Point)   [ ] Inflammatory bowel disease                             (1 Point)                 [x ] Arthroscopic surgery                                        (2 Points)  [ ] Central venous access                                      (2 Points)                [x ] General surgery lasting more than 45 minutes   (2 Points)       [ ] Stroke (in the previous month)                          (5 Points)               [ ] Elective arthroplasty                                         (5 Points)            [x ] current or past malignancy                              (2 Points)                                                                                                       HEMATOLOGY RELATED FACTORS                                                 TRAUMA RELATED RISK FACTORS  [ ] Prior episodes of VTE                                     (3 Points)                [ ] Fracture of the hip, pelvis, or leg                       (5 Points)  [ ] Positive family history for VTE                         (3 Points)                 [ ] Acute spinal cord injury (in the previous month)  (5 Points)  [ ] Prothrombin 51500 A                                     (3 Points)                 [ ] Paralysis  (less than 1 month)                             (5 Points)  [ ] Factor V Leiden                                             (3 Points)                  [ ] Multiple Trauma within 1 month                        (5 Points)  [ ] Lupus anticoagulants                                     (3 Points)                                                           [ ] Anticardiolipin antibodies                               (3 Points)                                                       [ ] High homocysteine in the blood                      (3 Points)                                             [ ] Other congenital or acquired thrombophilia      (3 Points)                                                [ ] Heparin induced thrombocytopenia                  (3 Points)                                          Total Score [     8     ]    Risk:  Very low 0   Low 1 to 2   Moderate 3 to 4   High =5       VTE Prophylasix Recommednations:  [x ] mechanical pneumatic compression devices                                      [ ] contraindicated: _____________________  [ ] chemo prophylasix                                                                                   [x ] contraindicated __bleeding risk___________________    **** HIGH LIKELIHOOD DVT PRESENT ON ADMISSION  [x ] (please order LE dopplers within 24 hours of admission)

## 2019-09-26 NOTE — PROGRESS NOTE ADULT - ASSESSMENT
Transphenoidal tumor resection with intra-op cerebrospinal fluid leak status post fat graft  - Monitor for cerebrospinal fluid leak  - Monitor for DI  - Pituitary labs  - -140mmHg  - Skull base precautions  - MRI brain in AM Transphenoidal tumor resection with intra-op cerebrospinal fluid leak status post fat graft, post-operative day 0  - Monitor for cerebrospinal fluid leak  - Monitor for DI  - Pituitary labs  - -140mmHg  - Skull base precautions  - MRI brain in AM

## 2019-09-26 NOTE — BRIEF OPERATIVE NOTE - NSICDXBRIEFPROCEDURE_GEN_ALL_CORE_FT
PROCEDURES:  Endoscopic transphenoidal or transnasal resection of pituitary tumor 26-Sep-2019 15:07:05  Jose Spear

## 2019-09-26 NOTE — PROGRESS NOTE ADULT - SUBJECTIVE AND OBJECTIVE BOX
HPI:  59 year old male with hx of htn, sleep apnea, prostate cancer 2018 on remission. Patient came in for PSt today for endoscopic transphenoidal removal of pituitary tumor. Patient went to see ophthalmologist due to vision disorder. Patient  examined  sent for xray which was positive for pituitary tumor. Patient  was referred to Dr Rodriguez, evaluated and now  scheduled this procedure for treatment. (12 Sep 2019 09:02)    Surgery 9/26: Endoscopic transphenoidal or transnasal resection of pituitary tumor     PAST MEDICAL & SURGICAL HISTORY:  Sleep apnea: uses oral device x 10 years  Acute diverticulitis: hx perforation 2008 had colon resection with colostomy and reversal 2009  Prostate cancer: had cyber knife treatment on remission 2018  Hypertension: on medication  Benign parotid tumor: excision 2009  History of torn meniscus of left knee: 1995  Bladder tumor: excision  2014  H/O arthroscopy of knee: right knee 1995  History of colostomy reversal: 2009 /colon resection 2008    Home Medications:  Allegra 30 mg oral tablet:  (26 Sep 2019 11:06)  amLODIPine 5 mg oral tablet: 1 tab(s) orally once a day (26 Sep 2019 11:06)  Ecotrin Adult Low Strength 81 mg oral delayed release tablet: 1 tab(s) orally once a day (26 Sep 2019 11:06)  Fish Oil oral capsule:  (26 Sep 2019 11:06)  Ginkgo: orally once a day (26 Sep 2019 11:06)  losartan 100 mg oral tablet: 1 tab(s) orally once a day (26 Sep 2019 11:06)  tamsulosin 0.4 mg oral capsule: orally once a day (at bedtime) (26 Sep 2019 11:06)  Vitamin D3 2000 intl units oral tablet: 1 tab(s) orally once a day (26 Sep 2019 11:06)    ICU Vital Signs Last 24 Hrs  T(C): 36.8 (26 Sep 2019 11:12), Max: 36.8 (26 Sep 2019 11:12)  T(F): 98.2 (26 Sep 2019 11:12), Max: 98.2 (26 Sep 2019 11:12)  HR: 68 (26 Sep 2019 11:12) (68 - 68)  BP: 113/72 (26 Sep 2019 11:12) (113/72 - 113/72)  RR: 18 (26 Sep 2019 11:12) (18 - 18)  SpO2: 100% (26 Sep 2019 11:12) (100% - 100%)    MEDICATIONS  (STANDING):  ceFAZolin   IVPB 2000 milliGRAM(s) IV Intermittent once    MEDICATIONS  (PRN):      General: No acute distress  HEENT: Anicteric sclerae  Cardiac: [id number]  Lungs: Clear  Abdomen: Soft, non-tender, +BS  Extremities: No c/c/e  Skin/Incision Site: Clean, dry and intact  Neurologic: Awake, alert, fully oriented, follows commands, PERRL, VFFtc, EOMI, face symmetric, tongue midline, no drift, full strength    Lab and imaging reviewed HPI:  59 year old male with hx of htn, sleep apnea, prostate cancer 2018 on remission. Patient came in for PSt today for endoscopic transphenoidal removal of pituitary tumor. Patient went to see ophthalmologist due to vision disorder. Patient  examined  sent for xray which was positive for pituitary tumor. Patient  was referred to Dr Rodriguez, evaluated and now  scheduled this procedure for treatment. (12 Sep 2019 09:02)    Surgery 9/26: Endoscopic transphenoidal or transnasal resection of pituitary tumor     PAST MEDICAL & SURGICAL HISTORY:  Sleep apnea: uses oral device x 10 years  Acute diverticulitis: hx perforation 2008 had colon resection with colostomy and reversal 2009  Prostate cancer: had cyber knife treatment on remission 2018  Hypertension: on medication  Benign parotid tumor: excision 2009  History of torn meniscus of left knee: 1995  Bladder tumor: excision  2014  H/O arthroscopy of knee: right knee 1995  History of colostomy reversal: 2009 /colon resection 2008    Home Medications:  Allegra 30 mg oral tablet:  (26 Sep 2019 11:06)  amLODIPine 5 mg oral tablet: 1 tab(s) orally once a day (26 Sep 2019 11:06)  Ecotrin Adult Low Strength 81 mg oral delayed release tablet: 1 tab(s) orally once a day (26 Sep 2019 11:06)  Fish Oil oral capsule:  (26 Sep 2019 11:06)  Ginkgo: orally once a day (26 Sep 2019 11:06)  losartan 100 mg oral tablet: 1 tab(s) orally once a day (26 Sep 2019 11:06)  tamsulosin 0.4 mg oral capsule: orally once a day (at bedtime) (26 Sep 2019 11:06)  Vitamin D3 2000 intl units oral tablet: 1 tab(s) orally once a day (26 Sep 2019 11:06)    ICU Vital Signs Last 24 Hrs  T(C): 36.8 (26 Sep 2019 11:12), Max: 36.8 (26 Sep 2019 11:12)  T(F): 98.2 (26 Sep 2019 11:12), Max: 98.2 (26 Sep 2019 11:12)  HR: 68 (26 Sep 2019 11:12) (68 - 68)  BP: 113/72 (26 Sep 2019 11:12) (113/72 - 113/72)  RR: 18 (26 Sep 2019 11:12) (18 - 18)  SpO2: 100% (26 Sep 2019 11:12) (100% - 100%)    MEDICATIONS  (STANDING):  ceFAZolin   IVPB 2000 milliGRAM(s) IV Intermittent once    MEDICATIONS  (PRN):      General: No acute distress  HEENT: Anicteric sclerae  Cardiac: [id number]  Lungs: Clear  Abdomen: Soft, non-tender, +BS  Extremities: No c/c/e  Skin/Incision Site: Clean, dry and intact  Neurologic: Awake, alert, fully oriented, follows commands, PERRL, VF could not be tested given drowsiness  EOMI, face symmetric, tongue midline, no drift, full strength    Lab and imaging reviewed

## 2019-09-26 NOTE — PRE-ANESTHESIA EVALUATION ADULT - NSANTHPEFT_GEN_ALL_CORE
General: Alert and oriented x 3, well-groomed, obese  Heart: RRR  Lungs: CTABL  Neuro: Grossly intact

## 2019-09-26 NOTE — PROGRESS NOTE ADULT - ASSESSMENT
ASSESSMENT/PLAN: Endoscopic transphenoidal or transnasal resection of pituitary tumor POD #1    NEURO:  CT Head in AM, MRI post-op, Surgical drains per NSGY,   Pain control  DI watch  cortisol level in am  watch to csf leak   Activity: [] OOB as tolerated [x] Bedrest [] PT [] OT [] PMNR    PULM:  Incentive spirometry, mobilize as tolerated    CV:  -150mmHg, d/c a-line in AM    RENAL:  IVF until good PO intake, d/c merlos in AM    GI:  Diet: Dysphagia screen and then advance diet as tolerated  GI prophylaxis [x] not indicated [] PPI [] other:  Bowel regimen [] colace [] senna [] other:    ENDO:   Goal euglycemia (-180)    HEME/ONC:  VTE prophylaxis: [] SCDs [] chemoprophylaxis [x] hold chemoprophylaxis due to: fresh post op [] high risk of DVT/PE on admission due to:    ID:  Jade-op antibiotics    MISC:    SOCIAL/FAMILY:  [x] awaiting [] updated at bedside [] family meeting    CODE STATUS:  [x] Full Code [] DNR [] DNI [] Palliative/Comfort Care    DISPOSITION:  [] ICU [] Stroke Unit [] Floor [] EMU [] RCU [] PCU    [] Patient is at high risk of neurologic deterioration/death due to:       Contact: [phone] ASSESSMENT/PLAN: Endoscopic transphenoidal or transnasal resection of pituitary tumor POD #1    NEURO:  CT Head in AM, MRI post-op, Surgical drains per NSGY,   Pain control  DI watch  cortisol level in am  watch to csf leak   Activity: [] OOB as tolerated [x] Bedrest [] PT [] OT [] PMNR    PULM:  Incentive spirometry, mobilize as tolerated    CV:  -150mmHg, d/c a-line in AM    RENAL:  IVF until good PO intake, d/c merlos in AM  monitor for DI, if UO > 300 ml/hr for 2 hours, will get urine SG, and stat BMP     GI:  Diet: Dysphagia screen and then advance diet as tolerated  GI prophylaxis [x] not indicated [] PPI [] other:  Bowel regimen [] colace [] senna [] other:    ENDO:   Goal euglycemia (-180)    HEME/ONC:  VTE prophylaxis: [] SCDs [] chemoprophylaxis [x] hold chemoprophylaxis due to: fresh post op [] high risk of DVT/PE on admission due to:    ID:  Jade-op antibiotics    MISC:    SOCIAL/FAMILY:  [x] awaiting [] updated at bedside [] family meeting    CODE STATUS:  [x] Full Code [] DNR [] DNI [] Palliative/Comfort Care    DISPOSITION:  [] ICU [] Stroke Unit [] Floor [] EMU [] RCU [] PCU    [] Patient is at high risk of neurologic deterioration/death due to: IPH,CSF leak       Contact: [phone]

## 2019-09-26 NOTE — PROGRESS NOTE ADULT - ASSESSMENT
58 YO M with PMH of HTN, MATEO, Prostate CA [2018], in remission. Pt with blurry vision, found to have Pituitary Adenoma, S/P Trans Sphenoidal Resection of Pituitary POD #0. Pt had CSF leak in Intra op, repaired with right thigh fat graft and naso pore. Pt was evaluated at bedside, admits to HA, frontal , 8/10 pain. No CSF leak on exam.

## 2019-09-26 NOTE — PROGRESS NOTE ADULT - SUBJECTIVE AND OBJECTIVE BOX
Underwent transphenoidal mass resection with intra-op cerebrospinal fluid leak status post right thigh fat graft.    Denies salty taste in back of throat, denies change in vision    Awake, alert, fully oriented, full strength Underwent transphenoidal mass resection with intra-op cerebrospinal fluid leak status post right thigh fat graft.    Denies salty taste in back of throat, denies change in vision    Awake, alert, fully oriented, bitemporal hemianopsia, full strength

## 2019-09-26 NOTE — PROGRESS NOTE ADULT - SUBJECTIVE AND OBJECTIVE BOX
ENT ISSUE/POD: S/P Trans Sphenoidal Resection of Pituitary POD #0.     HPI: 60 YO M with PMH of HTN, MATEO, Prostate CA [2018], in remission. Pt with blurry vision, found to have Pituitary Adenoma, S/P Trans Sphenoidal Resection of Pituitary POD #0. Pt had CSF leak in Intra op, repaired with right thigh fat graft and naso pore. Pt was evaluated at bedside, admits to HA, [facility] , 8/10 pain. Denies runny nose, nasal discharge, salty taste in mouth, Dizziness/ Syncope, fever/ chills, N/V.    PAST MEDICAL & SURGICAL HISTORY:  Sleep apnea: uses oral device x 10 years  Acute diverticulitis: hx perforation 2008 had colon resection with colostomy and reversal 2009  Prostate cancer: had cyber knife treatment on remission 2018  Hypertension: on medication  Benign parotid tumor: excision 2009  History of torn meniscus of left knee: 1995  Bladder tumor: excision  2014  H/O arthroscopy of knee: right knee 1995  History of colostomy reversal: 2009 /colon resection 2008    Allergies  No Known Allergies    Intolerances  MEDICATIONS  (STANDING):  amLODIPine   Tablet 5 milliGRAM(s) Oral daily  ceFAZolin   IVPB 2000 milliGRAM(s) IV Intermittent every 8 hours  ceFAZolin   IVPB 2000 milliGRAM(s) IV Intermittent once  chlorhexidine 4% Liquid 1 Application(s) Topical <User Schedule>  docusate sodium 100 milliGRAM(s) Oral two times a day  loratadine 10 milliGRAM(s) Oral daily  losartan 100 milliGRAM(s) Oral daily  niCARdipine Infusion 5 mG/Hr (25 mL/Hr) IV Continuous <Continuous>  senna 2 Tablet(s) Oral at bedtime  sodium chloride 0.9%. 1000 milliLiter(s) (75 mL/Hr) IV Continuous <Continuous>  tamsulosin 0.4 milliGRAM(s) Oral at bedtime    MEDICATIONS  (PRN):  ondansetron Injectable 4 milliGRAM(s) IV Push every 6 hours PRN Nausea and/or Vomiting  oxyCODONE    5 mG/acetaminophen 325 mG 1 Tablet(s) Oral every 4 hours PRN Moderate Pain (4 - 6)    Social History: SEE HPI.  Family history: SEE HPI.     ROS:   ENT: all negative except as noted in HPI   Pulm: denies SOB, cough, hemoptysis  Neuro: denies numbness/tingling, loss of sensation  Endo: denies heat/cold intolerance, excessive sweating      Vital Signs Last 24 Hrs  T(C): 37 (26 Sep 2019 23:00), Max: 37 (26 Sep 2019 23:00)  T(F): 98.6 (26 Sep 2019 23:00), Max: 98.6 (26 Sep 2019 23:00)  HR: 95 (27 Sep 2019 00:00) (65 - 100)  BP: 141/68 (26 Sep 2019 20:30) (113/72 - 141/68)  BP(mean): 88 (26 Sep 2019 20:30) (88 - 88)  RR: 14 (27 Sep 2019 00:00) (10 - 20)  SpO2: 94% (27 Sep 2019 00:00) (93% - 100%)                          13.0   14.6  )-----------( 273      ( 26 Sep 2019 20:38 )             38.0    09-26    137  |  102  |  9   ----------------------------<  154<H>  3.7   |  23  |  0.74    Ca    9.0      26 Sep 2019 20:38  Phos  2.5     09-26  Mg     1.8     09-26     PHYSICAL EXAM:  Gen: NAD, On RA.   Skin: No rashes, bruises, or lesions.  Head: Normocephalic, Atraumatic.  Face: no edema, erythema, or fluctuance. Parotid glands soft without mass.  Eyes: no scleral injection.  Nose: Crusted blood in b/l Nares, not actively bleeding, mustache dressing in place.   Mouth: No Stridor / Drooling / Trismus.  Mucosa moist, tongue/uvula midline, oropharynx clear.   Neck: Flat, supple, no lymphadenopathy, trachea midline, no masses.  Lymphatic: No lymphadenopathy.  Resp: breathing easily, no stridor  Neuro: facial nerve intact, no facial droop.

## 2019-09-26 NOTE — PRE-ANESTHESIA EVALUATION ADULT - NSANTHVITALSIGNSFT_GEN_ALL_CORE
Vital Signs Last 24 Hrs  T(C): 36.8 (26 Sep 2019 11:12), Max: 36.8 (26 Sep 2019 11:12)  T(F): 98.2 (26 Sep 2019 11:12), Max: 98.2 (26 Sep 2019 11:12)  HR: 68 (26 Sep 2019 11:12) (68 - 68)  BP: 113/72 (26 Sep 2019 11:12) (113/72 - 113/72)  BP(mean): --  RR: 18 (26 Sep 2019 11:12) (18 - 18)  SpO2: 100% (26 Sep 2019 11:12) (100% - 100%)

## 2019-09-26 NOTE — PATIENT PROFILE ADULT - FUNCTIONAL SCREEN CURRENT LEVEL: EATING, MLM
3/29/2019      RE: Driss Dillon  [address]  [place] [id number]       Physical Fitness Assessment:    See Fitness Action Plan for information.    Sekou Solorzano, PhD.  Exercise Physiologist  Fitness     Sekou Solorzano, PhD      
3/29/2019     RE: Driss Dillon  [address]  [place] [id number]     Dear Colleague,    Thank you for referring your patient, Driss Dillon, to the [facility] at [facility]. Please see a copy of my visit note below.    Physical Fitness Assessment:    See Fitness Action Plan for information.    Sekou Solorzano, PhD.  Exercise Physiologist  Fitness     Again, thank you for allowing me to participate in the care of your patient.      Sincerely,    Sekou Solorzano, PhD      
0 = independent

## 2019-09-27 DIAGNOSIS — E27.49 OTHER ADRENOCORTICAL INSUFFICIENCY: ICD-10-CM

## 2019-09-27 DIAGNOSIS — D35.2 BENIGN NEOPLASM OF PITUITARY GLAND: ICD-10-CM

## 2019-09-27 DIAGNOSIS — K59.00 CONSTIPATION, UNSPECIFIED: ICD-10-CM

## 2019-09-27 DIAGNOSIS — E23.2 DIABETES INSIPIDUS: ICD-10-CM

## 2019-09-27 DIAGNOSIS — I10 ESSENTIAL (PRIMARY) HYPERTENSION: ICD-10-CM

## 2019-09-27 LAB
ANION GAP SERPL CALC-SCNC: 8 MMOL/L — SIGNIFICANT CHANGE UP (ref 5–17)
ANION GAP SERPL CALC-SCNC: 9 MMOL/L — SIGNIFICANT CHANGE UP (ref 5–17)
BUN SERPL-MCNC: 10 MG/DL — SIGNIFICANT CHANGE UP (ref 7–23)
BUN SERPL-MCNC: 11 MG/DL — SIGNIFICANT CHANGE UP (ref 7–23)
CALCIUM SERPL-MCNC: 9 MG/DL — SIGNIFICANT CHANGE UP (ref 8.4–10.5)
CALCIUM SERPL-MCNC: 9 MG/DL — SIGNIFICANT CHANGE UP (ref 8.4–10.5)
CHLORIDE SERPL-SCNC: 105 MMOL/L — SIGNIFICANT CHANGE UP (ref 96–108)
CHLORIDE SERPL-SCNC: 106 MMOL/L — SIGNIFICANT CHANGE UP (ref 96–108)
CO2 SERPL-SCNC: 22 MMOL/L — SIGNIFICANT CHANGE UP (ref 22–31)
CO2 SERPL-SCNC: 23 MMOL/L — SIGNIFICANT CHANGE UP (ref 22–31)
CORTIS AM PEAK SERPL-MCNC: 2.2 UG/DL — LOW (ref 6–18.4)
CREAT SERPL-MCNC: 0.83 MG/DL — SIGNIFICANT CHANGE UP (ref 0.5–1.3)
CREAT SERPL-MCNC: 0.91 MG/DL — SIGNIFICANT CHANGE UP (ref 0.5–1.3)
GLUCOSE SERPL-MCNC: 116 MG/DL — HIGH (ref 70–99)
GLUCOSE SERPL-MCNC: 131 MG/DL — HIGH (ref 70–99)
MAGNESIUM SERPL-MCNC: 2.5 MG/DL — SIGNIFICANT CHANGE UP (ref 1.6–2.6)
OSMOLALITY SERPL: 294 MOSMOL/KG — SIGNIFICANT CHANGE UP (ref 275–300)
OSMOLALITY UR: 156 MOS/KG — LOW (ref 300–900)
PHOSPHATE SERPL-MCNC: 3.7 MG/DL — SIGNIFICANT CHANGE UP (ref 2.5–4.5)
POTASSIUM SERPL-MCNC: 4.3 MMOL/L — SIGNIFICANT CHANGE UP (ref 3.5–5.3)
POTASSIUM SERPL-MCNC: 4.8 MMOL/L — SIGNIFICANT CHANGE UP (ref 3.5–5.3)
POTASSIUM SERPL-SCNC: 4.3 MMOL/L — SIGNIFICANT CHANGE UP (ref 3.5–5.3)
POTASSIUM SERPL-SCNC: 4.8 MMOL/L — SIGNIFICANT CHANGE UP (ref 3.5–5.3)
SODIUM SERPL-SCNC: 136 MMOL/L — SIGNIFICANT CHANGE UP (ref 135–145)
SODIUM SERPL-SCNC: 137 MMOL/L — SIGNIFICANT CHANGE UP (ref 135–145)
SP GR UR STRIP: 1 — LOW (ref 1.01–1.02)

## 2019-09-27 PROCEDURE — 99223 1ST HOSP IP/OBS HIGH 75: CPT

## 2019-09-27 PROCEDURE — 70553 MRI BRAIN STEM W/O & W/DYE: CPT | Mod: 26

## 2019-09-27 PROCEDURE — 93970 EXTREMITY STUDY: CPT | Mod: 26

## 2019-09-27 PROCEDURE — 99024 POSTOP FOLLOW-UP VISIT: CPT

## 2019-09-27 PROCEDURE — 99291 CRITICAL CARE FIRST HOUR: CPT

## 2019-09-27 RX ORDER — INFLUENZA VIRUS VACCINE 15; 15; 15; 15 UG/.5ML; UG/.5ML; UG/.5ML; UG/.5ML
0.5 SUSPENSION INTRAMUSCULAR ONCE
Refills: 0 | Status: DISCONTINUED | OUTPATIENT
Start: 2019-09-27 | End: 2019-09-30

## 2019-09-27 RX ORDER — TRAMADOL HYDROCHLORIDE 50 MG/1
50 TABLET ORAL EVERY 4 HOURS
Refills: 0 | Status: DISCONTINUED | OUTPATIENT
Start: 2019-09-27 | End: 2019-09-30

## 2019-09-27 RX ORDER — TRAMADOL HYDROCHLORIDE 50 MG/1
25 TABLET ORAL EVERY 4 HOURS
Refills: 0 | Status: DISCONTINUED | OUTPATIENT
Start: 2019-09-27 | End: 2019-09-30

## 2019-09-27 RX ADMIN — LOSARTAN POTASSIUM 100 MILLIGRAM(S): 100 TABLET, FILM COATED ORAL at 05:17

## 2019-09-27 RX ADMIN — AMLODIPINE BESYLATE 5 MILLIGRAM(S): 2.5 TABLET ORAL at 05:17

## 2019-09-27 RX ADMIN — TRAMADOL HYDROCHLORIDE 25 MILLIGRAM(S): 50 TABLET ORAL at 21:17

## 2019-09-27 RX ADMIN — TRAMADOL HYDROCHLORIDE 50 MILLIGRAM(S): 50 TABLET ORAL at 09:35

## 2019-09-27 RX ADMIN — TRAMADOL HYDROCHLORIDE 50 MILLIGRAM(S): 50 TABLET ORAL at 00:10

## 2019-09-27 RX ADMIN — SENNA PLUS 2 TABLET(S): 8.6 TABLET ORAL at 21:15

## 2019-09-27 RX ADMIN — TRAMADOL HYDROCHLORIDE 50 MILLIGRAM(S): 50 TABLET ORAL at 16:55

## 2019-09-27 RX ADMIN — TAMSULOSIN HYDROCHLORIDE 0.4 MILLIGRAM(S): 0.4 CAPSULE ORAL at 21:15

## 2019-09-27 RX ADMIN — Medication 100 MILLIGRAM(S): at 05:18

## 2019-09-27 RX ADMIN — TRAMADOL HYDROCHLORIDE 50 MILLIGRAM(S): 50 TABLET ORAL at 09:05

## 2019-09-27 RX ADMIN — Medication 100 MILLIGRAM(S): at 05:17

## 2019-09-27 RX ADMIN — TRAMADOL HYDROCHLORIDE 50 MILLIGRAM(S): 50 TABLET ORAL at 15:00

## 2019-09-27 RX ADMIN — TRAMADOL HYDROCHLORIDE 50 MILLIGRAM(S): 50 TABLET ORAL at 05:50

## 2019-09-27 RX ADMIN — TRAMADOL HYDROCHLORIDE 50 MILLIGRAM(S): 50 TABLET ORAL at 05:17

## 2019-09-27 RX ADMIN — LORATADINE 10 MILLIGRAM(S): 10 TABLET ORAL at 15:03

## 2019-09-27 RX ADMIN — TRAMADOL HYDROCHLORIDE 25 MILLIGRAM(S): 50 TABLET ORAL at 20:21

## 2019-09-27 NOTE — CONSULT NOTE ADULT - SUBJECTIVE AND OBJECTIVE BOX
CC: blurry vision, admitted for transsphenoidal resection of pituitary macroadenoma  HPI: 59 M PMH HTN, MATEO, Prostate ca in remission s/p cyberknife, diverticulitis c/b perforation s/p resection and colostomy with reversal, bladder tumor s/p excision,  presented few wks ago with blurry vision, f/w pituitary macroadenoma. Now s/p transsphenoidal resection of pituitary POD #1. Course c/b intra-op CSF leak s/p R thigh fat graft and naso pore. Course further c/b increased urine output concerning for central DI.  Currently, pt endorses ___________.   REVIEW OF SYSTEMS:  CONSTITUTIONAL: No weakness. No fevers. No chills. No weight loss. Good appetite.  EYES: No visual changes. No eye pain.  ENT: No hearing difficulty. No vertigo. No dysphagia. No Sinusitis/rhinorrhea.  NECK: No pain. No stiffness/rigidity.  CARDIAC: No chest pain. No palpitations.  RESPIRATORY: No cough. No SOB. No hemoptysis.  GASTROINTESTINAL: No abdominal pain. No nausea. No vomiting. No hematemesis. No diarrhea. No constipation. No melena. No hematochezia.  GENITOURINARY: No dysuria. No frequency. No hesitancy. No hematuria.  NEUROLOGICAL: No numbness. No focal weakness. No incontinence. No headache.  BACK: No back pain.  EXTREMITIES: No lower extremity edema. Full ROM.  SKIN: No rashes. No itching. No other lesions.  PSYCHIATRIC: No depression. No anxiety. No SI/HI.  ALLERGIC: No lip swelling. No hives.  All other review of systems is negative unless indicated above.  [  ] Unable to assess ROS because    PAST MEDICAL & SURGICAL HISTORY:  Sleep apnea: uses oral device x 10 years  Acute diverticulitis: hx perforation 2008 had colon resection with colostomy and reversal 2009  Prostate cancer: had cyber knife treatment on remission 2018  Hypertension: on medication  Benign parotid tumor: excision 2009  History of torn meniscus of left knee: 1995  Bladder tumor: excision  2014  H/O arthroscopy of knee: right knee 1995  History of colostomy reversal: 2009 /colon resection 2008    Social History:    Marital Status:  (   )    (   ) Single    (   )    (  )   Occupation:   Lives with: (  ) alone  (  ) children   (  ) spouse   (  ) parents  (  ) other    Substance Use (street drugs): (  ) never used  (  ) other:  Tobacco Usage:  (   ) never smoked   (   ) former smoker   (   ) current smoker  (     ) pack year  (        ) last cigarette date  Alcohol Usage:  Sexual History:     FAMILY HISTORY:    MEDICATIONS  (STANDING):  amLODIPine   Tablet 5 milliGRAM(s) Oral daily  docusate sodium 100 milliGRAM(s) Oral two times a day  influenza   Vaccine 0.5 milliLiter(s) IntraMuscular once  loratadine 10 milliGRAM(s) Oral daily  losartan 100 milliGRAM(s) Oral daily  senna 2 Tablet(s) Oral at bedtime  tamsulosin 0.4 milliGRAM(s) Oral at bedtime    MEDICATIONS  (PRN):  ondansetron Injectable 4 milliGRAM(s) IV Push every 6 hours PRN Nausea and/or Vomiting  traMADol 25 milliGRAM(s) Oral every 4 hours PRN Moderate Pain (4 - 6)  traMADol 50 milliGRAM(s) Oral every 4 hours PRN Severe Pain (7 - 10)    Allergies    No Known Allergies    Intolerances    Vital Signs Last 24 Hrs  T(C): 36.7 (27 Sep 2019 19:28), Max: 38.3 (27 Sep 2019 05:00)  T(F): 98 (27 Sep 2019 19:28), Max: 100.9 (27 Sep 2019 05:00)  HR: 88 (27 Sep 2019 19:28) (83 - 102)  BP: 137/72 (27 Sep 2019 19:28) (117/71 - 137/72)  BP(mean): 84 (27 Sep 2019 15:00) (84 - 84)  RR: 18 (27 Sep 2019 19:28) (12 - 20)  SpO2: 96% (27 Sep 2019 19:28) (91% - 97%)    PHYSICAL EXAM:  GENERAL: NAD, well-groomed, well-developed  HEAD:  Atraumatic, Normocephalic  EYES: EOMI, PERRLA, conjunctiva and sclera clear  ENMT: No tonsillar erythema, exudates, or enlargement; Moist mucous membranes, Good dentition, No lesions  NECK: Supple, No JVD, Normal thyroid  CHEST/LUNG: Clear to percussion bilaterally; No rales, rhonchi, wheezing, or rubs  HEART: Regular rate and rhythm; No murmurs, rubs, or gallops  ABDOMEN: Soft, Nontender, Nondistended; Bowel sounds present  EXTREMITIES:  2+ Peripheral Pulses, No clubbing, cyanosis, or edema  LYMPH: No lymphadenopathy noted  SKIN: No rashes or lesions  NERVOUS SYSTEM:  Alert & Oriented X3, Good concentration; Motor Strength 5/5 B/L upper and lower extremities; DTRs 2+ intact and symmetric    Labs personally reviewed   09-27    137  |  106  |  11  ----------------------------<  116<H>  4.8   |  22  |  0.91  09-27    136  |  105  |  10  ----------------------------<  131<H>  4.3   |  23  |  0.83  09-26    137  |  102  |  9   ----------------------------<  154<H>  3.7   |  23  |  0.74    Ca    9.0      27 Sep 2019 09:20  Ca    9.0      27 Sep 2019 05:41  Ca    9.0      26 Sep 2019 20:38  Phos  3.7     09-27  Mg     2.5     09-27                 13.0   14.6  )-----------( 273      ( 26 Sep 2019 20:38 )             38.0         Specific Gravity by Meter, Urine: 1.005    Osmolality, Random Urine: 156 mos/kg (09.27.19 @ 05:42)  Osmolality, Serum: 294: "New Reference Range effective 7-8-2019" mosmol/kg (09.27.19 @ 05:41)  Cortisol AM, Serum: 2.2 ug/dL (09.27.19 @ 13:34)        Imaging personally reviewed   < from: MR Head w/wo IV Cont (09.27.19 @ 16:35) >  IMPRESSION:    Status post resection of pituitary macroadenoma without gross evidence   for residual tumor.    < end of copied text >    < from: CT Head No Cont (09.26.19 @ 10:24) >  IMPRESSION: Stereotactic imaging of the large sellar mass with a   macroadenoma extending into the suprasellar cistern with optic chiasm   compression.    < end of copied text >    < from: [facility] Duplex Lower Ext Vein Scan, Bilat (09.27.19 @ 14:06) >  IMPRESSION:     No evidence of deep venous thrombosis in either lower extremity.    < end of copied text > CC: blurry vision, admitted for transsphenoidal resection of pituitary macroadenoma  HPI: 59 M PMH HTN, MATEO, Prostate ca in remission s/p cyberknife, diverticulitis c/b perforation s/p resection and colostomy with reversal, bladder tumor s/p excision,  presented few wks ago with blurry vision, f/w pituitary macroadenoma. Now s/p transsphenoidal resection of pituitary POD #1. Course c/b intra-op CSF leak s/p R thigh fat graft and naso pore. Course further c/b increased urine output concerning for central DI.  Currently, pt endorses ongoing frontal headache but greatly improved with tramadol, currently 3/10. NO visual changes or deficits. NO cp/sob/f/c. No n/v/d, +chronic constipation.   REVIEW OF SYSTEMS:  CONSTITUTIONAL: No weakness. No fevers. No chills. No weight loss. Good appetite.  EYES: resolving visual changes. No eye pain.  ENT: No hearing difficulty. No vertigo. No dysphagia. No Sinusitis/rhinorrhea.  NECK: No pain. No stiffness/rigidity.  CARDIAC: No chest pain. No palpitations.  RESPIRATORY: No cough. No SOB. No hemoptysis.  GASTROINTESTINAL: No abdominal pain. No nausea. No vomiting. No hematemesis. No diarrhea. +constipation. No melena. No hematochezia.  GENITOURINARY: No dysuria. No frequency. No hesitancy. No hematuria.  NEUROLOGICAL: No numbness. No focal weakness. No incontinence. +headache.  BACK: No back pain.  EXTREMITIES: No lower extremity edema. Full ROM.  SKIN: No rashes. No itching. No other lesions.  PSYCHIATRIC: No depression. No anxiety. No SI/HI.  ALLERGIC: No lip swelling. No hives.  All other review of systems is negative unless indicated above.      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       FAMILY HISTORY:  HTN (parents)    MEDICATIONS  (STANDING):  amLODIPine   Tablet 5 milliGRAM(s) Oral daily  docusate sodium 100 milliGRAM(s) Oral two times a day  influenza   Vaccine 0.5 milliLiter(s) IntraMuscular once  loratadine 10 milliGRAM(s) Oral daily  losartan 100 milliGRAM(s) Oral daily  senna 2 Tablet(s) Oral at bedtime  tamsulosin 0.4 milliGRAM(s) Oral at bedtime    MEDICATIONS  (PRN):  ondansetron Injectable 4 milliGRAM(s) IV Push every 6 hours PRN Nausea and/or Vomiting  traMADol 25 milliGRAM(s) Oral every 4 hours PRN Moderate Pain (4 - 6)  traMADol 50 milliGRAM(s) Oral every 4 hours PRN Severe Pain (7 - 10)    Allergies    No Known Allergies    Intolerances    Vital Signs Last 24 Hrs  T(C): 36.7 (27 Sep 2019 19:28), Max: 38.3 (27 Sep 2019 05:00)  T(F): 98 (27 Sep 2019 19:28), Max: 100.9 (27 Sep 2019 05:00)  HR: 88 (27 Sep 2019 19:28) (83 - 102)  BP: 137/72 (27 Sep 2019 19:28) (117/71 - 137/72)  BP(mean): 84 (27 Sep 2019 15:00) (84 - 84)  RR: 18 (27 Sep 2019 19:28) (12 - 20)  SpO2: 96% (27 Sep 2019 19:28) (91% - 97%)    PHYSICAL EXAM:  GENERAL: NAD, well-groomed, well-developed  HEAD:  Atraumatic, Normocephalic  EYES: EOMI, PERRLA, conjunctiva and sclera clear  ENMT: No tonsillar erythema, exudates, or enlargement; Moist mucous membranes, Good dentition, No lesions  NECK: Supple, No JVD, Normal thyroid  CHEST/LUNG: Clear to percussion bilaterally; No rales, rhonchi, wheezing, or rubs no resp distress or accessory muscle usage  HEART: Regular rate and rhythm; No murmurs, rubs, or gallops no sig Le edema  ABDOMEN: Soft, Nontender, Nondistended; Bowel sounds present no rebound/guarding  EXTREMITIES:  2+ Peripheral Pulses, No clubbing, cyanosis  LYMPH: No lymphadenopathy noted, no lymphangitis  SKIN: No rashes or lesions, no petechiae  NERVOUS SYSTEM:  Alert & Oriented X3, Good concentration; Motor Strength 5/5 B/L upper and lower extremities. no facial droop or dysarthria.     Labs personally reviewed   09-27    137  |  106  |  11  ----------------------------<  116<H>  4.8   |  22  |  0.91  09-27    136  |  105  |  10  ----------------------------<  131<H>  4.3   |  23  |  0.83  09-26    137  |  102  |  9   ----------------------------<  154<H>  3.7   |  23  |  0.74    Ca    9.0      27 Sep 2019 09:20  Ca    9.0      27 Sep 2019 05:41  Ca    9.0      26 Sep 2019 20:38  Phos  3.7     09-27  Mg     2.5     09-27                 13.0   14.6  )-----------( 273      ( 26 Sep 2019 20:38 )             38.0         Specific Gravity by Meter, Urine: 1.005    Osmolality, Random Urine: 156 mos/kg (09.27.19 @ 05:42)  Osmolality, Serum: 294: "New Reference Range effective 7-8-2019" mosmol/kg (09.27.19 @ 05:41)  Cortisol AM, Serum: 2.2 ug/dL (09.27.19 @ 13:34)        Imaging personally reviewed   < from: MR Head w/wo IV Cont (09.27.19 @ 16:35) >  IMPRESSION:    Status post resection of pituitary macroadenoma without gross evidence   for residual tumor.    < end of copied text >    < from: CT Head No Cont (09.26.19 @ 10:24) >  IMPRESSION: Stereotactic imaging of the large sellar mass with a   macroadenoma extending into the suprasellar cistern with optic chiasm   compression.    < end of copied text >    < from: VA Duplex Lower Ext Vein Scan, Bilat (09.27.19 @ 14:06) >  IMPRESSION:     No evidence of deep venous thrombosis in either lower extremity.    < end of copied text >

## 2019-09-27 NOTE — PROVIDER CONTACT NOTE (OTHER) - ASSESSMENT
Pt c/o headache and pain meds given. Neurologically intact. Pt noted to be sweaty and c/o increase in thirst.

## 2019-09-27 NOTE — PROGRESS NOTE ADULT - ASSESSMENT
ASSESSMENT/PLAN: Endoscopic transphenoidal or transnasal resection of pituitary tumor POD #1    NEURO:  MRI post-op  nasal precautions  Pain control  DI watch - patient is having > 300 cc UO for consecutive hours, urine spec gravity is 1.005, however sodium remains 136.  Will check STAT sodium level now.  IVL patient   f/u AM cortisol level    watch for csf leak   Activity: [] OOB as tolerated [] Bedrest [x] PT [] OT [] PMNR    PULM:  RA, O2 sat > 92%    CV:  -150mmHg   HTN: c/w PO antihypertensives     RENAL:  DI watch - patient is having > 300 cc UO for consecutive hours, urine spec gravity is 1.005, however sodium remains 136.  Will check STAT sodium level now.  IVL patient   BPH: c/w tamsulosin     GI:  Diet:  diet as tolerated  GI prophylaxis [x] not indicated [] PPI [] other:  Bowel regimen [] colace [] senna [] other:    ENDO:   Goal euglycemia (-180)    HEME/ONC:  VTE prophylaxis: [] SCDs [] chemoprophylaxis - SQL      ID:  Jade-op antibiotics    SOCIAL/FAMILY:  [x] awaiting [] updated at bedside [] family meeting    CODE STATUS:  [x] Full Code [] DNR [] DNI [] Palliative/Comfort Care    DISPOSITION:  [x] ICU [] Stroke Unit [] Floor [] EMU [] RCU [] PCU    [x] Patient is at high risk of neurologic deterioration/death due to: IPH,CSF leak, DI watch       Contact: [phone] ASSESSMENT/PLAN: Endoscopic transphenoidal or transnasal resection of pituitary tumor POD #1    NEURO:  MRI post-op today  nasal precautions  Pain control  DI watch - patient is having > 300 cc UO for consecutive hours, urine spec gravity is 1.005, however sodium remains 136.  Will check STAT sodium level now.  IVL patient   f/u AM cortisol level    watch for csf leak   Activity: [] OOB as tolerated [] Bedrest [x] PT [] OT [] PMNR    PULM:  RA, O2 sat > 92%    CV:  -150mmHg   HTN: c/w PO antihypertensives     RENAL:  DI watch - patient is having > 300 cc UO for consecutive hours, urine spec gravity is 1.005, however sodium remains 136.  Will check STAT sodium level now.  IVL patient   BPH: c/w tamsulosin     GI:  Diet:  diet as tolerated  GI prophylaxis [x] not indicated [] PPI [] other:  Bowel regimen [] colace [] senna [] other:    ENDO:   Goal euglycemia (-180)    HEME/ONC:  VTE prophylaxis: [] SCDs [] chemoprophylaxis - SQL tonight after MRI     ID:  Jade-op antibiotics    SOCIAL/FAMILY:  [x] awaiting [] updated at bedside [] family meeting    CODE STATUS:  [x] Full Code [] DNR [] DNI [] Palliative/Comfort Care    DISPOSITION:  [x] ICU [] Stroke Unit [] Floor [] EMU [] RCU [] PCU    [x] Patient is at high risk of neurologic deterioration/death due to: IPH,CSF leak, DI watch       Contact: [phone]

## 2019-09-27 NOTE — PROGRESS NOTE ADULT - SUBJECTIVE AND OBJECTIVE BOX
Patient seen and examined at bedside.    --Anticoagulation--    T(C): 36.7 (09-27-19 @ 03:00), Max: 37 (09-26-19 @ 23:00)  HR: 98 (09-27-19 @ 04:00) (65 - 100)  BP: 141/68 (09-26-19 @ 20:30) (113/72 - 141/68)  RR: 15 (09-27-19 @ 04:00) (10 - 20)  SpO2: 91% (09-27-19 @ 04:00) (91% - 100%)  Wt(kg): --    Exam:  AOx3, FC, PERRL, EOMI, no facial, VF bitemporal hemianopsia (baseline)   5/5 throughout, no drift  SILT  no clonus

## 2019-09-27 NOTE — DIETITIAN INITIAL EVALUATION ADULT. - REASON INDICATOR FOR ASSESSMENT
Nutrition Assessment warranted for length of stay.  Information obtained from: RN, comprehensive chart review, interdisciplinary medical rounds Nutrition Assessment warranted for length of stay.  Information obtained from: RN, comprehensive chart review, interdisciplinary medical rounds, patient

## 2019-09-27 NOTE — PROGRESS NOTE ADULT - SUBJECTIVE AND OBJECTIVE BOX
ENT ISSUE/POD: s/p TSRP adenoma POD 1.    HPI: 58yo male s/p TSRP adenoma c/b intra-op CSF leak repaired with thigh graft and nasopore packing placement POD 1. Pt seen and examined at bedside. No acute events overnight. Pt c/o headache, improved with pain meds. Pt denies salty or metallic taste, clear nasal discharge, epistaxis.         PAST MEDICAL & SURGICAL HISTORY:  Sleep apnea: uses oral device x 10 years  Acute diverticulitis: hx perforation 2008 had colon resection with colostomy and reversal 2009  Prostate cancer: had cyber knife treatment on remission 2018  Hypertension: on medication  Benign parotid tumor: excision 2009  History of torn meniscus of left knee: 1995  Bladder tumor: excision  2014  H/O arthroscopy of knee: right knee 1995  History of colostomy reversal: 2009 /colon resection 2008    Allergies    No Known Allergies    Intolerances      MEDICATIONS  (STANDING):  amLODIPine   Tablet 5 milliGRAM(s) Oral daily  ceFAZolin   IVPB 2000 milliGRAM(s) IV Intermittent every 8 hours  chlorhexidine 4% Liquid 1 Application(s) Topical <User Schedule>  docusate sodium 100 milliGRAM(s) Oral two times a day  influenza   Vaccine 0.5 milliLiter(s) IntraMuscular once  loratadine 10 milliGRAM(s) Oral daily  losartan 100 milliGRAM(s) Oral daily  niCARdipine Infusion 5 mG/Hr (25 mL/Hr) IV Continuous <Continuous>  senna 2 Tablet(s) Oral at bedtime  tamsulosin 0.4 milliGRAM(s) Oral at bedtime    MEDICATIONS  (PRN):  ondansetron Injectable 4 milliGRAM(s) IV Push every 6 hours PRN Nausea and/or Vomiting  traMADol 25 milliGRAM(s) Oral every 4 hours PRN Moderate Pain (4 - 6)  traMADol 50 milliGRAM(s) Oral every 4 hours PRN Severe Pain (7 - 10)      Social History: see consult note    Family history: see consult note    ROS:   ENT: all negative except as noted in HPI   Pulm: denies SOB, cough, hemoptysis  Neuro: denies numbness/tingling, loss of sensation  Endo: denies heat/cold intolerance, excessive sweating      Vital Signs Last 24 Hrs  T(C): 37.3 (27 Sep 2019 07:00), Max: 38.3 (27 Sep 2019 05:00)  T(F): 99.1 (27 Sep 2019 07:00), Max: 100.9 (27 Sep 2019 05:00)  HR: 102 (27 Sep 2019 08:00) (65 - 102)  BP: 141/68 (26 Sep 2019 20:30) (113/72 - 141/68)  BP(mean): 88 (26 Sep 2019 20:30) (88 - 88)  RR: 17 (27 Sep 2019 08:00) (10 - 20)  SpO2: 96% (27 Sep 2019 08:00) (91% - 100%)                          13.0   14.6  )-----------( 273      ( 26 Sep 2019 20:38 )             38.0    09-27    136  |  105  |  10  ----------------------------<  131<H>  4.3   |  23  |  0.83    Ca    9.0      27 Sep 2019 05:41  Phos  2.5     09-26  Mg     1.8     09-26         PHYSICAL EXAM:  Gen: NAD  Skin: No rashes, bruises, or lesions.  Head: Normocephalic, Atraumatic.  Face: no edema, erythema, or fluctuance. Parotid glands soft without mass.  Eyes: no scleral injection.  Nose: Crusted blood in b/l Nares, not actively bleeding, mustache dressing in place.   Mouth: No Stridor / Drooling / Trismus.  Mucosa moist, tongue/uvula midline, oropharynx clear.   Neck: Flat, supple, no lymphadenopathy, trachea midline, no masses.  Lymphatic: No lymphadenopathy.  Resp: breathing easily, no stridor  Neuro: facial nerve intact, no facial droop.

## 2019-09-27 NOTE — PHYSICAL THERAPY INITIAL EVALUATION ADULT - GENERAL OBSERVATIONS, REHAB EVAL
received supine with head of bed elevated +jersey + bilateral sequential compression devices and wife at bedside

## 2019-09-27 NOTE — DIETITIAN INITIAL EVALUATION ADULT. - ADD RECOMMEND
1) Continue diet with no therapeutic restrictions as ordered. Defer consistency to medical team, SLP. 2) Monitor and encourage PO intake. Encourage use of daily menus to promote optimal PO intake potential. Honor dietary preferences as expressed. 3) Fluids as per discretion of medical team; pt on DI watch. 4) Monitor wt trends, nutrition related labs, skin integrity, hydration status and bowel regimen. RD to remain available.

## 2019-09-27 NOTE — PHYSICAL THERAPY INITIAL EVALUATION ADULT - ADDITIONAL COMMENTS
lives with family in private house with a few step to enter without rail and 2 more with 1 rail, 6 steps inside with 1 rail, right hand dominant    no c/o salty or metallic taste in mouth

## 2019-09-27 NOTE — PHYSICAL THERAPY INITIAL EVALUATION ADULT - PERTINENT HX OF CURRENT PROBLEM, REHAB EVAL
PMHx: htn, sleep apnea, prostate cancer 2018 in remission. Patient went to see ophthalmologist due to vision disorder. Patient  examined  sent for xray which was positive for pituitary tumor. s/p transphenoidal resection of pituitary tumor 9/26 with Intra op CSF leak, repaired with right thigh fat graft and naso pore.

## 2019-09-27 NOTE — PHYSICAL THERAPY INITIAL EVALUATION ADULT - PLANNED THERAPY INTERVENTIONS, PT EVAL
balance training/stair training: GOAL: (to be met in 4 wks) negotiate 6 steps with 1 rail and appropriate assistive device with step to step pattern independently/transfer training/strengthening/gait training

## 2019-09-27 NOTE — DIETITIAN INITIAL EVALUATION ADULT. - OTHER INFO
Pt is a 58 yo M with PMH: HTN, sleep apnea, prostate cancer 2018 on remission. Pt s/p endoscopic transphenoidal or transnasal resection of pituitary tumor on 9/26 with intraop CSF leak with fat graft.     INFORMATION PTA  ·Diet PTA:   ·Nutrition Status PTA:  ·Nutrition Supplements PTA: Per review of surgical H&P, pt was taking daily Vit D3, fish oil oral capsule   ·Food Allergies: No known food allergies  ·Weight History PTA:   ·Other Subjective Information:  INFORMATION THIS ADMISSION  ·OGT/NGT Output x 24-hours:  ·Last BM:  ·Ileostomy/Colostomy Output x 24-hours:  ·Other Subjective Information:  ·Therapeutic Diet Education Provided: Pt is a 58 yo M with PMH: HTN, sleep apnea, prostate cancer 2018 on remission. Pt s/p endoscopic transphenoidal or transnasal resection of pituitary tumor on 9/26 with intraop CSF leak with fat graft.     INFORMATION PTA  ·Diet PTA: Per discussion with pt, reports appetite PTA "normal". Consumed a "light breakfast, sandwich for lunch and a normal dinner". Dietary preferences notable for no pork, "very little seafood"; prefers chicken. Pt expressed "I can't eat like how I used to". Denies adherence to therapeutic/mechanically altered diet PTA. Denies food allergies, denies intolerance to chewing/swallowing.   ·Nutrition Supplements PTA: Per pt, pt reportedly took daily fish oil, vit D3; QOD multivitamin and Vit C.   ·Food Allergies: No known food allergies  ·Weight History PTA: Pt reports UBW ~205 lbs. Reports gradual wt loss within past few years from 225 lbs to 205 lbs 2/2 decrease in overall PO intake. Dosing wt 207.5 lbs. Appears consistent with reported UBW. Will monitor.   ·Other Subjective Information: Pt endorsed history of chronic constipation; typically relieved by intake of stool softeners, fiber (bar, power).  To note, pt reports history of colostomy in 2008.  INFORMATION THIS ADMISSION  ·Last BM: No BM's recorded in-house at this time. On bowel regimen as ordered.   ·Other Subjective Information: Pt observed at start of breakfast meal (9/27). Consuming cheerio's with milk at time of RD visit. Pt reports that this is his first meal he has received (was NPO yesterday, 9/26) and reports fair appetite at this time. Pt encouraged and educated on menu ordering procedures. Encouraged to utilize to promote optimal PO intake potential. Encouraged fiber intake 2/2 history of chronic constipation. Pt amenable to receive oral supplement (health shakes) at this time.

## 2019-09-27 NOTE — CONSULT NOTE ADULT - PROBLEM SELECTOR RECOMMENDATION 2
-s/p surgery, pt has had high volume UOP >300 cc/hr  -labs reviewed: normal serum Na, uOSM 156 and dilue with spec grav 1.005, sOSM 295 a bit concentrated overall consistent with central DI with intact thirst response.   -suggest close monitoring of UOP, consider renal eval +/- desmopressin pending renal recommendations  -If hyperNa develops will need to replace free water deficit currently ok.

## 2019-09-27 NOTE — DIETITIAN INITIAL EVALUATION ADULT. - ENERGY NEEDS
Ht: 73"  Wt: 207.5 lbs  BMI: 27.4  kg/m2   IBW: 184 lbs (+/-10%)    113 % IBW  Edema:  none noted         Skin: surgical incision right thigh skin graft

## 2019-09-27 NOTE — PROGRESS NOTE ADULT - ASSESSMENT
s/p TSS with intraop CSF leak w fat graft   - monitor for CSF leak  - DI watch  - AM cortisol  - MRI

## 2019-09-27 NOTE — PHYSICAL THERAPY INITIAL EVALUATION ADULT - DID THE PATIENT HAVE SURGERY?
transphenoidal resection of pituitary tumor with Intra op CSF leak, repaired with right thigh fat graft and naso pore./yes

## 2019-09-27 NOTE — PROGRESS NOTE ADULT - SUBJECTIVE AND OBJECTIVE BOX
59 year old male with hx of htn, sleep apnea, prostate cancer 2018 on remission. Patient went to see ophthalmologist due to vision disorder. Patient sent for imaging which shown a pituitary tumor. Patient  was referred to Dr Rodriguez and underwent and Endoscopic transphenoidal resection of pituitary tumor on 9/26.    Overnight events:  Patient urinating a large amount every hour, +300 cc/hr for several consecutive hrs     VITALS:  Reviewed   LABS:  Reviewed  MEDICATIONS: Reviewed  IMAGING:  Recent imaging studies were reviewed.    General: No acute distress  HEENT: Anicteric sclerae  Cardiac: [id number]  Lungs: Clear  Abdomen: Soft, non-tender, +BS  Extremities: No c/c/e  Skin/Incision Site: Clean, dry and intact  Neurologic: Awake, alert, fully oriented, follows commands, PERRL, bitemporal hemianopsia EOMI, face symmetric, tongue midline, no drift, full strength

## 2019-09-27 NOTE — PROVIDER CONTACT NOTE (OTHER) - ASSESSMENT
Pt sweaty and warm to touch. Denies any chills. C/o headache and pain meds given with partial relief.

## 2019-09-27 NOTE — CONSULT NOTE ADULT - ASSESSMENT
59 M PMH HTN, MATEO, Prostate ca in remission s/p cyberknife, diverticulitis c/b perforation s/p resection and colostomy with reversal, bladder tumor s/p excision,  presented few wks ago with blurry vision, f/w pituitary macroadenoma. Now s/p transsphenoidal resection of pituitary POD #1. Medicine team consulted for co-management of medical issues.

## 2019-09-27 NOTE — CONSULT NOTE ADULT - NSHPATTENDINGPLANDISCUSS_GEN_ALL_CORE
patient, brother Dr. Velez (endocrine) via phone, neurosurgery resident.
pending improvement with medical status./home w/ level of assist

## 2019-09-27 NOTE — PROGRESS NOTE ADULT - ASSESSMENT
58yo male s/p TSRP adenoma c/b intra-op CSF leak repaired with thigh graft and nasopore placement POD 1. No evidence of CSF leak or epistaxis on exam.

## 2019-09-27 NOTE — PROVIDER CONTACT NOTE (OTHER) - ACTION/TREATMENT ORDERED:
PA made aware. Repeat labs of BMP, serum osmolality, urine osmolality, and urine specific gravity ordered. Continue to monitor pt.

## 2019-09-28 LAB
ANION GAP SERPL CALC-SCNC: 10 MMOL/L — SIGNIFICANT CHANGE UP (ref 5–17)
ANION GAP SERPL CALC-SCNC: 11 MMOL/L — SIGNIFICANT CHANGE UP (ref 5–17)
ANION GAP SERPL CALC-SCNC: 9 MMOL/L — SIGNIFICANT CHANGE UP (ref 5–17)
BUN SERPL-MCNC: 16 MG/DL — SIGNIFICANT CHANGE UP (ref 7–23)
BUN SERPL-MCNC: 17 MG/DL — SIGNIFICANT CHANGE UP (ref 7–23)
BUN SERPL-MCNC: 20 MG/DL — SIGNIFICANT CHANGE UP (ref 7–23)
CALCIUM SERPL-MCNC: 9.5 MG/DL — SIGNIFICANT CHANGE UP (ref 8.4–10.5)
CALCIUM SERPL-MCNC: 9.8 MG/DL — SIGNIFICANT CHANGE UP (ref 8.4–10.5)
CALCIUM SERPL-MCNC: 9.8 MG/DL — SIGNIFICANT CHANGE UP (ref 8.4–10.5)
CHLORIDE SERPL-SCNC: 105 MMOL/L — SIGNIFICANT CHANGE UP (ref 96–108)
CHLORIDE SERPL-SCNC: 108 MMOL/L — SIGNIFICANT CHANGE UP (ref 96–108)
CHLORIDE SERPL-SCNC: 110 MMOL/L — HIGH (ref 96–108)
CO2 SERPL-SCNC: 27 MMOL/L — SIGNIFICANT CHANGE UP (ref 22–31)
CO2 SERPL-SCNC: 27 MMOL/L — SIGNIFICANT CHANGE UP (ref 22–31)
CO2 SERPL-SCNC: 28 MMOL/L — SIGNIFICANT CHANGE UP (ref 22–31)
CORTIS AM PEAK SERPL-MCNC: 2.2 UG/DL — LOW (ref 6–18.4)
CREAT SERPL-MCNC: 0.97 MG/DL — SIGNIFICANT CHANGE UP (ref 0.5–1.3)
CREAT SERPL-MCNC: 1.02 MG/DL — SIGNIFICANT CHANGE UP (ref 0.5–1.3)
CREAT SERPL-MCNC: 1.12 MG/DL — SIGNIFICANT CHANGE UP (ref 0.5–1.3)
GLUCOSE SERPL-MCNC: 103 MG/DL — HIGH (ref 70–99)
GLUCOSE SERPL-MCNC: 121 MG/DL — HIGH (ref 70–99)
GLUCOSE SERPL-MCNC: 91 MG/DL — SIGNIFICANT CHANGE UP (ref 70–99)
HCT VFR BLD CALC: 38.4 % — LOW (ref 39–50)
HGB BLD-MCNC: 11.9 G/DL — LOW (ref 13–17)
MCHC RBC-ENTMCNC: 28.3 PG — SIGNIFICANT CHANGE UP (ref 27–34)
MCHC RBC-ENTMCNC: 31 GM/DL — LOW (ref 32–36)
MCV RBC AUTO: 91.4 FL — SIGNIFICANT CHANGE UP (ref 80–100)
PLATELET # BLD AUTO: 304 K/UL — SIGNIFICANT CHANGE UP (ref 150–400)
POTASSIUM SERPL-MCNC: 4.1 MMOL/L — SIGNIFICANT CHANGE UP (ref 3.5–5.3)
POTASSIUM SERPL-MCNC: 4.5 MMOL/L — SIGNIFICANT CHANGE UP (ref 3.5–5.3)
POTASSIUM SERPL-MCNC: 4.6 MMOL/L — SIGNIFICANT CHANGE UP (ref 3.5–5.3)
POTASSIUM SERPL-SCNC: 4.1 MMOL/L — SIGNIFICANT CHANGE UP (ref 3.5–5.3)
POTASSIUM SERPL-SCNC: 4.5 MMOL/L — SIGNIFICANT CHANGE UP (ref 3.5–5.3)
POTASSIUM SERPL-SCNC: 4.6 MMOL/L — SIGNIFICANT CHANGE UP (ref 3.5–5.3)
RBC # BLD: 4.2 M/UL — SIGNIFICANT CHANGE UP (ref 4.2–5.8)
RBC # FLD: 15 % — HIGH (ref 10.3–14.5)
SODIUM SERPL-SCNC: 143 MMOL/L — SIGNIFICANT CHANGE UP (ref 135–145)
SODIUM SERPL-SCNC: 145 MMOL/L — SIGNIFICANT CHANGE UP (ref 135–145)
SODIUM SERPL-SCNC: 147 MMOL/L — HIGH (ref 135–145)
WBC # BLD: 11.18 K/UL — HIGH (ref 3.8–10.5)
WBC # FLD AUTO: 11.18 K/UL — HIGH (ref 3.8–10.5)

## 2019-09-28 PROCEDURE — 99233 SBSQ HOSP IP/OBS HIGH 50: CPT

## 2019-09-28 PROCEDURE — 99254 IP/OBS CNSLTJ NEW/EST MOD 60: CPT

## 2019-09-28 RX ORDER — INSULIN LISPRO 100/ML
VIAL (ML) SUBCUTANEOUS
Refills: 0 | Status: DISCONTINUED | OUTPATIENT
Start: 2019-09-28 | End: 2019-09-28

## 2019-09-28 RX ORDER — INSULIN GLARGINE 100 [IU]/ML
18 INJECTION, SOLUTION SUBCUTANEOUS
Refills: 0 | Status: DISCONTINUED | OUTPATIENT
Start: 2019-09-28 | End: 2019-09-28

## 2019-09-28 RX ORDER — DESMOPRESSIN ACETATE 0.1 MG/1
1 TABLET ORAL ONCE
Refills: 0 | Status: COMPLETED | OUTPATIENT
Start: 2019-09-28 | End: 2019-09-28

## 2019-09-28 RX ORDER — DESMOPRESSIN ACETATE 0.1 MG/1
0.1 TABLET ORAL ONCE
Refills: 0 | Status: COMPLETED | OUTPATIENT
Start: 2019-09-28 | End: 2019-09-28

## 2019-09-28 RX ORDER — ACETAMINOPHEN 500 MG
1000 TABLET ORAL ONCE
Refills: 0 | Status: COMPLETED | OUTPATIENT
Start: 2019-09-28 | End: 2019-09-28

## 2019-09-28 RX ORDER — ACETAMINOPHEN 500 MG
650 TABLET ORAL EVERY 6 HOURS
Refills: 0 | Status: DISCONTINUED | OUTPATIENT
Start: 2019-09-28 | End: 2019-09-29

## 2019-09-28 RX ORDER — INSULIN LISPRO 100/ML
VIAL (ML) SUBCUTANEOUS AT BEDTIME
Refills: 0 | Status: DISCONTINUED | OUTPATIENT
Start: 2019-09-28 | End: 2019-09-28

## 2019-09-28 RX ORDER — ENOXAPARIN SODIUM 100 MG/ML
40 INJECTION SUBCUTANEOUS
Refills: 0 | Status: DISCONTINUED | OUTPATIENT
Start: 2019-09-28 | End: 2019-09-30

## 2019-09-28 RX ORDER — SODIUM CHLORIDE 0.65 %
1 AEROSOL, SPRAY (ML) NASAL
Refills: 0 | Status: DISCONTINUED | OUTPATIENT
Start: 2019-09-28 | End: 2019-09-30

## 2019-09-28 RX ORDER — INSULIN LISPRO 100/ML
5 VIAL (ML) SUBCUTANEOUS
Refills: 0 | Status: DISCONTINUED | OUTPATIENT
Start: 2019-09-28 | End: 2019-09-28

## 2019-09-28 RX ORDER — OXYMETAZOLINE HYDROCHLORIDE 0.5 MG/ML
1 SPRAY NASAL
Refills: 0 | Status: DISCONTINUED | OUTPATIENT
Start: 2019-09-28 | End: 2019-09-30

## 2019-09-28 RX ADMIN — TRAMADOL HYDROCHLORIDE 25 MILLIGRAM(S): 50 TABLET ORAL at 06:13

## 2019-09-28 RX ADMIN — TRAMADOL HYDROCHLORIDE 25 MILLIGRAM(S): 50 TABLET ORAL at 07:00

## 2019-09-28 RX ADMIN — Medication 100 MILLIGRAM(S): at 06:12

## 2019-09-28 RX ADMIN — Medication 100 MILLIGRAM(S): at 17:36

## 2019-09-28 RX ADMIN — OXYMETAZOLINE HYDROCHLORIDE 1 SPRAY(S): 0.5 SPRAY NASAL at 16:25

## 2019-09-28 RX ADMIN — ONDANSETRON 4 MILLIGRAM(S): 8 TABLET, FILM COATED ORAL at 17:36

## 2019-09-28 RX ADMIN — AMLODIPINE BESYLATE 5 MILLIGRAM(S): 2.5 TABLET ORAL at 06:13

## 2019-09-28 RX ADMIN — Medication 1 SPRAY(S): at 16:24

## 2019-09-28 RX ADMIN — TRAMADOL HYDROCHLORIDE 50 MILLIGRAM(S): 50 TABLET ORAL at 17:36

## 2019-09-28 RX ADMIN — TRAMADOL HYDROCHLORIDE 25 MILLIGRAM(S): 50 TABLET ORAL at 13:30

## 2019-09-28 RX ADMIN — Medication 1000 MILLIGRAM(S): at 22:30

## 2019-09-28 RX ADMIN — LOSARTAN POTASSIUM 100 MILLIGRAM(S): 100 TABLET, FILM COATED ORAL at 06:13

## 2019-09-28 RX ADMIN — TRAMADOL HYDROCHLORIDE 25 MILLIGRAM(S): 50 TABLET ORAL at 12:54

## 2019-09-28 RX ADMIN — TRAMADOL HYDROCHLORIDE 50 MILLIGRAM(S): 50 TABLET ORAL at 08:50

## 2019-09-28 RX ADMIN — Medication 400 MILLIGRAM(S): at 21:45

## 2019-09-28 RX ADMIN — TRAMADOL HYDROCHLORIDE 50 MILLIGRAM(S): 50 TABLET ORAL at 18:06

## 2019-09-28 RX ADMIN — SENNA PLUS 2 TABLET(S): 8.6 TABLET ORAL at 21:45

## 2019-09-28 RX ADMIN — TRAMADOL HYDROCHLORIDE 25 MILLIGRAM(S): 50 TABLET ORAL at 00:24

## 2019-09-28 RX ADMIN — DESMOPRESSIN ACETATE 1 MICROGRAM(S): 0.1 TABLET ORAL at 06:13

## 2019-09-28 RX ADMIN — TAMSULOSIN HYDROCHLORIDE 0.4 MILLIGRAM(S): 0.4 CAPSULE ORAL at 21:45

## 2019-09-28 RX ADMIN — TRAMADOL HYDROCHLORIDE 25 MILLIGRAM(S): 50 TABLET ORAL at 01:26

## 2019-09-28 RX ADMIN — TRAMADOL HYDROCHLORIDE 50 MILLIGRAM(S): 50 TABLET ORAL at 09:20

## 2019-09-28 RX ADMIN — DESMOPRESSIN ACETATE 0.1 MICROGRAM(S): 0.1 TABLET ORAL at 04:18

## 2019-09-28 RX ADMIN — ENOXAPARIN SODIUM 40 MILLIGRAM(S): 100 INJECTION SUBCUTANEOUS at 17:42

## 2019-09-28 NOTE — PROVIDER CONTACT NOTE (OTHER) - ASSESSMENT
Patient alert and oriented, neuro status remains unchanged and stable. Patient currently voiding into urinal bottle. Patient states that he is very thirsty and trying not to drink water.

## 2019-09-28 NOTE — CONSULT NOTE ADULT - ATTENDING COMMENTS
Patient seen and examined with Rebel Sosa).   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  Diagnosed around July 2019 in the setting peripheral visual field lost.  Post operative noted to have increased urine output, low urine specific gravity and sodium of 145, patient received DDAVP 1mcg at 6am 9/28/2019 with improvement of urine output and thirst.  Monitor in/out closely.  If urine output greater than 300cc/hr check urine osmol, urine specific gravity and bmp before dosing DDAVP.   Noted to have low cortiosl level this Am, no signs of adrenal insufficiency, normal sodium, no hyperkalemia, no hypoglycemia, would hold of cosyntropin test until outpatient during this immediately postoperative state.    Would start hydrocortisone 10mg in the morning and 5mg in the afternoon and re-evaluate as outpatient.

## 2019-09-28 NOTE — PROVIDER CONTACT NOTE (OTHER) - BACKGROUND
Patient s/p endoscopic transphenoidal resection of pituitary tumor on 9/26 with intra-op CSF leak, s/p right thigh fat graft. Diabetes insipidus watch.

## 2019-09-28 NOTE — PROGRESS NOTE ADULT - ASSESSMENT
58 YO M with PMH of HTN, MATEO, Prostate CA [2018], in remission. Pt with blurry vision, found to have Pituitary Adenoma, S/P Trans Sphenoidal Resection of Pituitary Adenoma POD #2. . Pt had CSF leak in Intra op, repaired with right thigh fat graft and naso pore. Pt was evaluated at bedside, HA improving, c/o b/l Nasal congestion. No CSF leak on exam.

## 2019-09-28 NOTE — CONSULT NOTE ADULT - SUBJECTIVE AND OBJECTIVE BOX
HPI: 59 year old male with hx of htn, sleep apnea, prostate cancer 2018 on remission admitted for transphenoidal resection of pituitary tumor on 9/26/19. On MRI patient noted to have sella mass 2.3x2.2x3cm extending into the suprasellar cistern compressing optic chiasm. Endocrine consult requested for management of diabetes insipidus. After surgery patient reports increased thirst, increased urination and headache. Patient noted to have i/o 3225/2370 mL in last 24hours. urine osm 156. Specific gravity 1.005. serum Na 145. Patient was given DDAVP 1mcg 6am.     Patient was noted to have visual field deficit on eye exam, Then referred to ophthalmologist whom noted patient had bitemporal hemianopia. Patient then referred to Endocrinologist for HPA axis testing. Patient noted to have mild elevation in Prolactin, 23.6. Somatotropin were wnl, IGF1 72. Corticotrophin axis normal ACTH, 25, however cortisol low normal, 3. TFT were wnl, TSH 0.79, FT4 1.3. Gonadotropin assess wnl, FSH mildly elevated, 20.5, LH upper limit of normal 5.6, testosterone wnl, total testerone 510, free testerone 45.4. Patient does not report changes in libido, galactorrhea of infertility, no changes in hat/ ring/ shoe size. Patient does not report increased bruising, uncontrolled blood pressure, hyperglycemia or muscle weakness prior to surgery. Patient does not report heat/cold intolerance, increased fatigue prior to surgery, nausea vomiting or diarrhea., palpitations or tremors. Patient does report chronic constipation and weight loss due to change in diet.     PAST MEDICAL & SURGICAL HISTORY:  Sleep apnea: uses oral device x 10 years  Acute diverticulitis: hx perforation 2008 had colon resection with colostomy and reversal 2009  Prostate cancer: had cyber knife treatment on remission 2018  Hypertension: on medication  Benign parotid tumor: excision 2009  History of torn meniscus of left knee: 1995  Bladder tumor: excision  2014  H/O arthroscopy of knee: right knee 1995  History of colostomy reversal: 2009 /colon resection 2008    FAMILY HISTORY:  Does not report family history of brain tumor  Brother has history of papillary thyroid cancer     Social History:  Reports 7 cigarettes daily   Reports social alcohol use  Occasional marijuana use     Outpatient Medications:  Amlodipine  ASA  Losartan   Allegra   mulitvitamine     MEDICATIONS  (STANDING):  amLODIPine   Tablet 5 milliGRAM(s) Oral daily  docusate sodium 100 milliGRAM(s) Oral two times a day  enoxaparin Injectable 40 milliGRAM(s) SubCutaneous <User Schedule>  influenza   Vaccine 0.5 milliLiter(s) IntraMuscular once  loratadine 10 milliGRAM(s) Oral daily  losartan 100 milliGRAM(s) Oral daily  oxymetazoline 0.05% Nasal Spray 1 Spray(s) Both Nostrils two times a day  senna 2 Tablet(s) Oral at bedtime  sodium chloride 0.65% Nasal 1 Spray(s) Both Nostrils four times a day  tamsulosin 0.4 milliGRAM(s) Oral at bedtime    MEDICATIONS  (PRN):  ondansetron Injectable 4 milliGRAM(s) IV Push every 6 hours PRN Nausea and/or Vomiting  traMADol 25 milliGRAM(s) Oral every 4 hours PRN Moderate Pain (4 - 6)  traMADol 50 milliGRAM(s) Oral every 4 hours PRN Severe Pain (7 - 10)      Allergies  No Known Allergies    Review of Systems:  Constitutional: No fever  Eyes: +blurry vision  Neuro: No tremors  HEENT: No pain  Cardiovascular: No chest pain, palpitations  Respiratory: No SOB, no cough  GI: No nausea, vomiting, abdominal pain, + constipation   : No dysuria  Skin: no rash  Psych: no depression  Endocrine: no polyuria, polydipsia  Osteoporosis: no fractures    ALL OTHER SYSTEMS REVIEWED AND NEGATIVE    PHYSICAL EXAM:  VITALS: T(C): 36.5 (09-28-19 @ 12:21)  T(F): 97.7 (09-28-19 @ 12:21), Max: 98.2 (09-28-19 @ 08:36)  HR: 75 (09-28-19 @ 12:21) (75 - 88)  BP: 116/68 (09-28-19 @ 12:21) (116/68 - 137/72)  RR:  (16 - 18)  SpO2:  (93% - 96%)  Wt(kg): 94kg   GENERAL: NAD, well-groomed, well-developed  EYES: No proptosis, no lid lag, anicteric  HEENT:  Atraumatic, Normocephalic, moist mucous membranes  THYROID: Normal size, no palpable nodules  RESPIRATORY: Clear to auscultation bilaterally; No rales, rhonchi, wheezing  CARDIOVASCULAR: Regular rate and rhythm; No murmurs; no peripheral edema  GI: Soft, nontender, non distended, normal bowel sounds  SKIN: Dry, intact, No rashes or lesions  MUSCULOSKELETAL: Full range of motion, normal strength  NEURO: sensation intact, extraocular movements intact, no tremor  PSYCH: Alert and oriented x 3, reactive affect                         11.9   11.18 )-----------( 304      ( 28 Sep 2019 11:42 )             38.4       09-28    145  |  108  |  17  ----------------------------<  121<H>  4.1   |  28  |  1.02    EGFR if : 93  EGFR if non : 80    Ca    9.5      09-28  Mg     2.5     09-27  Phos  3.7     09-27

## 2019-09-28 NOTE — PROGRESS NOTE ADULT - SUBJECTIVE AND OBJECTIVE BOX
SUBJECTIVE: Patient seen and examined at bedside.  In NAD.  Denies any salty taste or drainage from nose.  Complains of mild headaches and increased thirst.     CHIEF COMPLAINT: visual changes    OVERNIGHT EVENTS: increased urine output, received DDAVP    Vital Signs Last 24 Hrs  T(C): 36.7 (28 Sep 2019 04:37), Max: 37.2 (27 Sep 2019 11:00)  T(F): 98.1 (28 Sep 2019 04:37), Max: 99 (27 Sep 2019 11:00)  HR: 84 (28 Sep 2019 04:37) (83 - 102)  BP: 124/71 (28 Sep 2019 04:37) (117/71 - 137/72)  BP(mean): 84 (27 Sep 2019 15:00) (84 - 84)  RR: 18 (28 Sep 2019 04:37) (12 - 18)  SpO2: 93% (28 Sep 2019 04:37) (91% - 97%)    PHYSICAL EXAM:    General: No Acute Distress     Neurological: Awake, alert oriented to person, place and time, Following Commands, +Bitemporal hemianopasia, Face Symmetrical, Speech Fluent, Moving all extremities, Muscle Strength normal in all four extremities, No Drift, Sensation to Light Touch Intact    Pulmonary: Clear to Auscultation, No Rales, No Rhonchi, No Wheezes     Cardiovascular: S1, S2, Regular Rate and Rhythm     Gastrointestinal: Soft, Nontender, Nondistended     LABS:                        13.0   14.6  )-----------( 273      ( 26 Sep 2019 20:38 )             38.0    09-28    147<H>  |  110<H>  |  20  ----------------------------<  103<H>  4.6   |  27  |  1.12    Ca    9.8      28 Sep 2019 03:56  Phos  3.7     09-27  Mg     2.5     09-27 09-27 @ 07:01  -  09-28 @ 07:00  --------------------------------------------------------  IN: 2310 mL / OUT: 3225 mL / NET: -915 mL      MEDICATIONS:  Antibiotics:    Neuro:  ondansetron Injectable 4 milliGRAM(s) IV Push every 6 hours PRN Nausea and/or Vomiting  traMADol 25 milliGRAM(s) Oral every 4 hours PRN Moderate Pain (4 - 6)  traMADol 50 milliGRAM(s) Oral every 4 hours PRN Severe Pain (7 - 10)    Cardiac:  amLODIPine   Tablet 5 milliGRAM(s) Oral daily  losartan 100 milliGRAM(s) Oral daily  tamsulosin 0.4 milliGRAM(s) Oral at bedtime    Pulm:  loratadine 10 milliGRAM(s) Oral daily    GI/:  docusate sodium 100 milliGRAM(s) Oral two times a day  senna 2 Tablet(s) Oral at bedtime    Other:   influenza   Vaccine 0.5 milliLiter(s) IntraMuscular once    DIET: [X] Regular [] CCD [] Renal [] Puree [] Dysphagia [] Tube Feeds:     IMAGING: < from: MR Head w/wo IV Cont (09.27.19 @ 16:35) >  New transsphenoidal postsurgicalchanges are present, with associated fat   packing at the level of the sella.    There has been interval resection of the sella-suprasellar mass. There is   no gross evidence for residual tumor. The optic chiasm compression has   resolved compared tothe preoperative study.    There is no evidence for acute infarct, brain parenchymal hemorrhage, or   hydrocephalus. Minimal chronic white matter changes are present.    A retention cyst or polyp involves right maxillary sinus. Postsurgical   opacification involves the nasal cavities and ethmoid sinuses.    < end of copied text >    < from: [facility] Duplex Lower Ext Vein Scan, Bilat (09.27.19 @ 14:06) >  No evidence of deep venous thrombosis in either lower extremity.    < end of copied text >

## 2019-09-28 NOTE — PROGRESS NOTE ADULT - ASSESSMENT
59 year old male with hx of htn, sleep apnea, prostate cancer 2018 on remission. Patient went to see ophthalmologist due to vision disorder. Patient sent for imaging which shown a pituitary tumor. Patient  was referred to Dr Rodriguez and underwent and Endoscopic transphenoidal resection of pituitary tumor on 9/26.    PROCEDURE: S/P Endoscopic transphenoidal or transnasal resection of pituitary tumor     POD# 2    PLAN:  NEURO: continue neuro checks q 4 hrs, monitor for CSF leak, DI watch, post op MRI shows no residual  PULM: Room air, NO incentive spirometer  CV: HTN: continue amlodipine, losartan  ENDO: Post op DI, received DDAVp 1mcg, continue to monitor strict I's/O's, BMP q 6, Endo consult called  HEME/ONC:   H/H stable           DVT ppx: [X] SQL [] SQH [X] Venodynes bilaterally   RENAL: IVL, continue flomax for BPH  ID: afebrile  GI: regular diet, bowel regimen  DISCHARGE PLANNING: no skilled needs      Assessment:  Please Check When Present   []  GCS  E   V  M     Heart Failure: []Acute, [] acute on chronic , []chronic  Heart Failure:  [] Diastolic (HFpEF), [] Systolic (HFrEF), []Combined (HFpEF and HFrEF), [] RHF, [] Pulm HTN, [] Other    [] CARLOZ, [] ATN, [] AIN, [] other  [] CKD1, [] CKD2, [] CKD 3, [] CKD 4, [] CKD 5, []ESRD    Encephalopathy: [] Metabolic, [] Hepatic, [] toxic, [] Neurological, [] Other    Abnormal Nurtitional Status: [] malnurtition (see nutrition note), [ ]underweight: BMI < 19, [] morbid obesity: BMI >40, [] Cachexia    [] Sepsis  [] hypovolemic shock,[] cardiogenic shock, [] hemorrhagic shock, [] neuogenic shock  [] Acute Respiratory Failure  []Cerebral edema, [] Brain compression/ herniation,   [] Functional quadriplegia  [] Acute blood loss anemia    Will discuss plan with Dr. Rodriguez  Spectralink # [id number]

## 2019-09-28 NOTE — PROVIDER CONTACT NOTE (OTHER) - SITUATION
Patient urinated 30ml at 20:24, 225ml at 21:30, and 550ml at 01:19. Patient states that he had two cups to drink with dinner and approximately additional 200ml of water with night time medication.

## 2019-09-28 NOTE — PROGRESS NOTE ADULT - ASSESSMENT
59 M PMH HTN, MATEO, Prostate ca in remission s/p cyberknife, diverticulitis c/b perforation s/p resection and colostomy with reversal, bladder tumor s/p excision,  presented few wks ago with blurry vision, f/w pituitary macroadenoma. Now s/p transphenoidal resection of pituitary on 9/25. Medicine team consulted for co-management of medical issues.

## 2019-09-28 NOTE — PROGRESS NOTE ADULT - SUBJECTIVE AND OBJECTIVE BOX
ENT ISSUE/POD: S/P Trans Sphenoidal Resection of Pituitary Adenoma  POD #2.     HPI: 58 YO M with PMH of HTN, MATEO, Prostate CA [2018], in remission. Pt with blurry vision, found to have Pituitary Adenoma, S/P Trans Sphenoidal Resection of Pituitary Adenoma POD #2. Pt had CSF leak in Intra op, repaired with right thigh fat graft and naso pore. Pt was evaluated at bedside, HA improving, c/o b/l Nasal congestion . Denies, nasal discharge, salty taste in mouth, Dizziness/ Syncope, fever/ chills, N/V.    PAST MEDICAL & SURGICAL HISTORY:  Sleep apnea: uses oral device x 10 years  Acute diverticulitis: hx perforation 2008 had colon resection with colostomy and reversal 2009  Prostate cancer: had cyber knife treatment on remission 2018  Hypertension: on medication  Benign parotid tumor: excision 2009  History of torn meniscus of left knee: 1995  Bladder tumor: excision  2014  H/O arthroscopy of knee: right knee 1995  History of colostomy reversal: 2009 /colon resection 2008    Allergies.   No Known Allergies    Intolerances.   MEDICATIONS  (STANDING):  amLODIPine   Tablet 5 milliGRAM(s) Oral daily  docusate sodium 100 milliGRAM(s) Oral two times a day  enoxaparin Injectable 40 milliGRAM(s) SubCutaneous <User Schedule>  influenza   Vaccine 0.5 milliLiter(s) IntraMuscular once  loratadine 10 milliGRAM(s) Oral daily  losartan 100 milliGRAM(s) Oral daily  oxymetazoline 0.05% Nasal Spray 1 Spray(s) Both Nostrils two times a day  senna 2 Tablet(s) Oral at bedtime  sodium chloride 0.65% Nasal 1 Spray(s) Both Nostrils four times a day  tamsulosin 0.4 milliGRAM(s) Oral at bedtime    MEDICATIONS  (PRN):  ondansetron Injectable 4 milliGRAM(s) IV Push every 6 hours PRN Nausea and/or Vomiting  traMADol 25 milliGRAM(s) Oral every 4 hours PRN Moderate Pain (4 - 6)  traMADol 50 milliGRAM(s) Oral every 4 hours PRN Severe Pain (7 - 10)    Social History: SEE HPI.   Family history: SEE HPI.     ROS:   ENT: all negative except as noted in HPI   Pulm: denies SOB, cough, hemoptysis  Neuro: denies numbness/tingling, loss of sensation  Endo: denies heat/cold intolerance, excessive sweating      Vital Signs Last 24 Hrs  T(C): 36.9 (28 Sep 2019 16:33), Max: 36.9 (28 Sep 2019 16:33)  T(F): 98.4 (28 Sep 2019 16:33), Max: 98.4 (28 Sep 2019 16:33)  HR: 70 (28 Sep 2019 16:33) (70 - 88)  BP: 125/73 (28 Sep 2019 16:33) (116/68 - 137/72)  BP(mean): --  RR: 18 (28 Sep 2019 16:33) (16 - 18)  SpO2: 97% (28 Sep 2019 16:33) (93% - 97%)                          11.9   11.18 )-----------( 304      ( 28 Sep 2019 11:42 )             38.4    09-28    145  |  108  |  17  ----------------------------<  121<H>  4.1   |  28  |  1.02    Ca    9.5      28 Sep 2019 12:19  Phos  3.7     09-27  Mg     2.5     09-27    PHYSICAL EXAM:  Gen: NAD, On RA.   Skin: No rashes, bruises, or lesions.  Head: Normocephalic, Atraumatic.  Face: no edema, erythema, or fluctuance. Parotid glands soft without mass.  Eyes: no scleral injection.  Nose: Crusted blood in b/l Nares, not actively bleeding.   Mouth: No Stridor / Drooling / Trismus.  Mucosa moist, tongue/uvula midline, oropharynx clear.   Neck: Flat, supple, no lymphadenopathy, trachea midline, no masses.  Lymphatic: No lymphadenopathy.  Resp: breathing easily, no stridor  Neuro: facial nerve intact, no facial droop. ENT ISSUE/POD: S/P Trans Sphenoidal Resection of Pituitary Adenoma POD #2.     HPI: 58 YO M with PMH of HTN, MATEO, Prostate CA [2018], in remission. Pt with blurry vision, found to have Pituitary Adenoma, S/P Trans Sphenoidal Resection of Pituitary Adenoma POD #2. Pt had CSF leak in Intra op, repaired with right thigh fat graft and naso pore. Pt was evaluated at bedside, HA improving, c/o b/l Nasal congestion . Denies, nasal discharge, salty taste in mouth, Dizziness/ Syncope, fever/ chills, N/V.    PAST MEDICAL & SURGICAL HISTORY:  Sleep apnea: uses oral device x 10 years  Acute diverticulitis: hx perforation 2008 had colon resection with colostomy and reversal 2009  Prostate cancer: had cyber knife treatment on remission 2018  Hypertension: on medication  Benign parotid tumor: excision 2009  History of torn meniscus of left knee: 1995  Bladder tumor: excision  2014  H/O arthroscopy of knee: right knee 1995  History of colostomy reversal: 2009 /colon resection 2008    Allergies.   No Known Allergies    Intolerances.   MEDICATIONS  (STANDING):  amLODIPine   Tablet 5 milliGRAM(s) Oral daily  docusate sodium 100 milliGRAM(s) Oral two times a day  enoxaparin Injectable 40 milliGRAM(s) SubCutaneous <User Schedule>  influenza   Vaccine 0.5 milliLiter(s) IntraMuscular once  loratadine 10 milliGRAM(s) Oral daily  losartan 100 milliGRAM(s) Oral daily  oxymetazoline 0.05% Nasal Spray 1 Spray(s) Both Nostrils two times a day  senna 2 Tablet(s) Oral at bedtime  sodium chloride 0.65% Nasal 1 Spray(s) Both Nostrils four times a day  tamsulosin 0.4 milliGRAM(s) Oral at bedtime    MEDICATIONS  (PRN):  ondansetron Injectable 4 milliGRAM(s) IV Push every 6 hours PRN Nausea and/or Vomiting  traMADol 25 milliGRAM(s) Oral every 4 hours PRN Moderate Pain (4 - 6)  traMADol 50 milliGRAM(s) Oral every 4 hours PRN Severe Pain (7 - 10)    Social History: SEE HPI.   Family history: SEE HPI.     ROS:   ENT: all negative except as noted in HPI   Pulm: denies SOB, cough, hemoptysis  Neuro: denies numbness/tingling, loss of sensation  Endo: denies heat/cold intolerance, excessive sweating    Vital Signs Last 24 Hrs  T(C): 36.9 (28 Sep 2019 16:33), Max: 36.9 (28 Sep 2019 16:33)  T(F): 98.4 (28 Sep 2019 16:33), Max: 98.4 (28 Sep 2019 16:33)  HR: 70 (28 Sep 2019 16:33) (70 - 88)  BP: 125/73 (28 Sep 2019 16:33) (116/68 - 137/72)  BP(mean): --  RR: 18 (28 Sep 2019 16:33) (16 - 18)  SpO2: 97% (28 Sep 2019 16:33) (93% - 97%)                          11.9   11.18 )-----------( 304      ( 28 Sep 2019 11:42 )             38.4    09-28    145  |  108  |  17  ----------------------------<  121<H>  4.1   |  28  |  1.02    Ca    9.5      28 Sep 2019 12:19  Phos  3.7     09-27  Mg     2.5     09-27    PHYSICAL EXAM:  Gen: NAD, On RA.   Skin: No rashes, bruises, or lesions.  Head: Normocephalic, Atraumatic.  Face: no edema, erythema, or fluctuance. Parotid glands soft without mass.  Eyes: no scleral injection.  Nose: Crusted blood in b/l Nares, not actively bleeding.   Mouth: No Stridor / Drooling / Trismus.  Mucosa moist, tongue/uvula midline, oropharynx clear.   Neck: Flat, supple, no lymphadenopathy, trachea midline, no masses.  Lymphatic: No lymphadenopathy.  Resp: breathing easily, no stridor  Neuro: facial nerve intact, no facial droop.

## 2019-09-28 NOTE — PROGRESS NOTE ADULT - SUBJECTIVE AND OBJECTIVE BOX
Carolann Hooker MD ([facility] Hospitalist)  Pager: [phone]  (During off hours please page [phone])      Patient is a 59y old  Male who presents with a chief complaint of sellar mass (28 Sep 2019 07:07)      SUBJECTIVE / OVERNIGHT EVENTS: No acute events overnight. Patient states was urinating every 45 min which improved s/p DDAVP injection. Denies any fevers, chills, headache, chest pain, SOB, abd pain, n/v/d, nor dysuria. urinary frequency and headache improved. +nasal congestion +dry mouth/throat.     MEDICATIONS  (STANDING):  amLODIPine   Tablet 5 milliGRAM(s) Oral daily  docusate sodium 100 milliGRAM(s) Oral two times a day  enoxaparin Injectable 40 milliGRAM(s) SubCutaneous <User Schedule>  influenza   Vaccine 0.5 milliLiter(s) IntraMuscular once  loratadine 10 milliGRAM(s) Oral daily  losartan 100 milliGRAM(s) Oral daily  senna 2 Tablet(s) Oral at bedtime  tamsulosin 0.4 milliGRAM(s) Oral at bedtime    MEDICATIONS  (PRN):  ondansetron Injectable 4 milliGRAM(s) IV Push every 6 hours PRN Nausea and/or Vomiting  traMADol 25 milliGRAM(s) Oral every 4 hours PRN Moderate Pain (4 - 6)  traMADol 50 milliGRAM(s) Oral every 4 hours PRN Severe Pain (7 - 10)      Vital Signs Last 24 Hrs  T(C): 36.5 (28 Sep 2019 12:21), Max: 37.2 (27 Sep 2019 14:59)  T(F): 97.7 (28 Sep 2019 12:21), Max: 99 (27 Sep 2019 14:59)  HR: 75 (28 Sep 2019 12:21) (75 - 94)  BP: 116/68 (28 Sep 2019 12:21) (116/68 - 137/72)  BP(mean): 84 (27 Sep 2019 15:00) (84 - 84)  RR: 18 (28 Sep 2019 12:21) (12 - 18)  SpO2: 95% (28 Sep 2019 12:21) (93% - 97%)  CAPILLARY BLOOD GLUCOSE        I&O's Summary    27 Sep 2019 07:01  -  28 Sep 2019 07:00  --------------------------------------------------------  IN: 2370 mL / OUT: 3225 mL / NET: -855 mL    28 Sep 2019 07:01  -  28 Sep 2019 12:57  --------------------------------------------------------  IN: 360 mL / OUT: 250 mL / NET: 110 mL        PHYSICAL EXAM:  GENERAL: NAD, well-developed  HEAD:  Atraumatic, Normocephalic  EYES: EOMI, PERRLA, conjunctiva and sclera clear  ENT: +moist mucous membranes, +dry nasal passages  NECK: Supple, No JVD  CHEST/LUNG: +rhonchi in b/l lung fields  HEART: Regular rate and rhythm; No murmurs, rubs, or gallops  ABDOMEN: Soft, Nontender, Nondistended; Bowel sounds present  EXTREMITIES:  2+ Peripheral Pulses, No clubbing, cyanosis, or edema  PSYCH: Cooperative, Normal mood and affect, Normal judgment  NEUROLOGY: non-focal, alert and oriented x 3, motor strength 5/5 in b/l upper and lower extremities  SKIN: No rashes or lesions      LABS:                        11.9   11.18 )-----------( 304      ( 28 Sep 2019 11:42 )             38.4     09-28    147<H>  |  110<H>  |  20  ----------------------------<  103<H>  4.6   |  27  |  1.12    Ca    9.8      28 Sep 2019 03:56  Phos  3.7     09-27  Mg     2.5     09-27      RADIOLOGY & ADDITIONAL TESTS:    Imaging Personally Reviewed:     Consultant(s) Notes Reviewed: Neurosurgery, ENT    Care Discussed with Consultants/Other Providers: Neurosurgery

## 2019-09-28 NOTE — CONSULT NOTE ADULT - ASSESSMENT
59 year old male with hx of htn, sleep apnea, prostate cancer 2018 on remission admitted for transphenoidal resection of pituitary tumor on 9/26/19. On MRI patient noted to have sella mass 2.3x2.2x3cm extending into the suprasellar cistern compressing optic chiasm. Endocrine consult requested for management of diabetes insipidus. After surgery patient reports increased thirst, increased urination and headache. Patient noted to have i/o 3225/2370 mL in last 24hours. urine osm 156. Specific gravity 1.005. serum Na 145. Patient was given DDAVP 1mcg 6am.       Nonfunctioning Pituitary Macroadenoma   S/p resection 9/26/19, currently reports headache and overnight increased thirst and urination   follow up pathology   patient will need formal visual field testing   Please obtain FT4  Patient will follow up with Endocrinologist at [facility]     Central DI   I/O 2370/3225 over last 24 hours   Patient was given DDAVP 1mcg qam for central DI   Monitor strict I/O  encourage oral hydration   Monitor for polyuria and polydipsia , urine outpt >250 mL/hr, urine osm <300, increase in sodium   Would continue DDAVP prn for now, if symptoms persist will consider standing DDAVP 0.5mcg     Adrenal Insufficiency   Cortisol 2.2        FINAL PLAN PENDING DISCUSSION WITH ATTENDING   AM cortisol 59 year old male with hx of htn, sleep apnea, prostate cancer 2018 on remission admitted for transphenoidal resection of pituitary tumor on 9/26/19. On MRI patient noted to have sella mass 2.3x2.2x3cm extending into the suprasellar cistern compressing optic chiasm. Endocrine consult requested for management of diabetes insipidus. After surgery patient reports increased thirst, increased urination and headache. Patient noted to have i/o 3225/2370 mL in last 24hours. urine osm 156. Specific gravity 1.005. serum Na 145. Patient was given DDAVP 1mcg 6am.       Nonfunctioning Pituitary Macroadenoma   S/p resection 9/26/19, currently reports headache and overnight increased thirst and urination   follow up pathology   patient will need formal visual field testing   Please obtain FT4  Patient will follow up with Endocrinologist at [facility]     Central DI   I/O 2370/3225 over last 24 hours   Patient was given DDAVP 1mcg qam for central DI   Monitor strict I/O  encourage oral hydration   Monitor for polyuria and polydipsia , urine outpt >250 mL/hr, urine osm <300, increase in sodium  Monitor BMP for Na  Would continue DDAVP prn for now, if symptoms persist will consider standing DDAVP 0.5mcg     Adrenal Insufficiency   Cortisol 2.2, outpt am cortisol 3.0  Recommend physiologic dosing of Hydrocortisone 10mg qam and 5mg qpm   On discharge please discuss sick day rules with patient (if fever or under stress patient to increase hydrocortisone by 2 or 3 times dose)  consider medical alert bracelet   Patient will need to follow up closely with outpt endocrinologist

## 2019-09-29 ENCOUNTER — TRANSCRIPTION ENCOUNTER (OUTPATIENT)
Age: 59
End: 2019-09-29

## 2019-09-29 DIAGNOSIS — I10 ESSENTIAL (PRIMARY) HYPERTENSION: ICD-10-CM

## 2019-09-29 LAB
ANION GAP SERPL CALC-SCNC: 10 MMOL/L — SIGNIFICANT CHANGE UP (ref 5–17)
ANION GAP SERPL CALC-SCNC: 12 MMOL/L — SIGNIFICANT CHANGE UP (ref 5–17)
ANION GAP SERPL CALC-SCNC: 8 MMOL/L — SIGNIFICANT CHANGE UP (ref 5–17)
ANION GAP SERPL CALC-SCNC: 9 MMOL/L — SIGNIFICANT CHANGE UP (ref 5–17)
BUN SERPL-MCNC: 13 MG/DL — SIGNIFICANT CHANGE UP (ref 7–23)
BUN SERPL-MCNC: 15 MG/DL — SIGNIFICANT CHANGE UP (ref 7–23)
CALCIUM SERPL-MCNC: 9.5 MG/DL — SIGNIFICANT CHANGE UP (ref 8.4–10.5)
CALCIUM SERPL-MCNC: 9.7 MG/DL — SIGNIFICANT CHANGE UP (ref 8.4–10.5)
CALCIUM SERPL-MCNC: 9.8 MG/DL — SIGNIFICANT CHANGE UP (ref 8.4–10.5)
CALCIUM SERPL-MCNC: 9.9 MG/DL — SIGNIFICANT CHANGE UP (ref 8.4–10.5)
CHLORIDE SERPL-SCNC: 101 MMOL/L — SIGNIFICANT CHANGE UP (ref 96–108)
CHLORIDE SERPL-SCNC: 104 MMOL/L — SIGNIFICANT CHANGE UP (ref 96–108)
CO2 SERPL-SCNC: 26 MMOL/L — SIGNIFICANT CHANGE UP (ref 22–31)
CO2 SERPL-SCNC: 28 MMOL/L — SIGNIFICANT CHANGE UP (ref 22–31)
CO2 SERPL-SCNC: 28 MMOL/L — SIGNIFICANT CHANGE UP (ref 22–31)
CO2 SERPL-SCNC: 30 MMOL/L — SIGNIFICANT CHANGE UP (ref 22–31)
CREAT SERPL-MCNC: 0.91 MG/DL — SIGNIFICANT CHANGE UP (ref 0.5–1.3)
CREAT SERPL-MCNC: 1.04 MG/DL — SIGNIFICANT CHANGE UP (ref 0.5–1.3)
CREAT SERPL-MCNC: 1.07 MG/DL — SIGNIFICANT CHANGE UP (ref 0.5–1.3)
CREAT SERPL-MCNC: 1.08 MG/DL — SIGNIFICANT CHANGE UP (ref 0.5–1.3)
GLUCOSE SERPL-MCNC: 110 MG/DL — HIGH (ref 70–99)
GLUCOSE SERPL-MCNC: 127 MG/DL — HIGH (ref 70–99)
GLUCOSE SERPL-MCNC: 89 MG/DL — SIGNIFICANT CHANGE UP (ref 70–99)
GLUCOSE SERPL-MCNC: 97 MG/DL — SIGNIFICANT CHANGE UP (ref 70–99)
POTASSIUM SERPL-MCNC: 4 MMOL/L — SIGNIFICANT CHANGE UP (ref 3.5–5.3)
POTASSIUM SERPL-MCNC: 4.1 MMOL/L — SIGNIFICANT CHANGE UP (ref 3.5–5.3)
POTASSIUM SERPL-MCNC: 4.2 MMOL/L — SIGNIFICANT CHANGE UP (ref 3.5–5.3)
POTASSIUM SERPL-MCNC: 4.2 MMOL/L — SIGNIFICANT CHANGE UP (ref 3.5–5.3)
POTASSIUM SERPL-SCNC: 4 MMOL/L — SIGNIFICANT CHANGE UP (ref 3.5–5.3)
POTASSIUM SERPL-SCNC: 4.1 MMOL/L — SIGNIFICANT CHANGE UP (ref 3.5–5.3)
POTASSIUM SERPL-SCNC: 4.2 MMOL/L — SIGNIFICANT CHANGE UP (ref 3.5–5.3)
POTASSIUM SERPL-SCNC: 4.2 MMOL/L — SIGNIFICANT CHANGE UP (ref 3.5–5.3)
SODIUM SERPL-SCNC: 139 MMOL/L — SIGNIFICANT CHANGE UP (ref 135–145)
SODIUM SERPL-SCNC: 141 MMOL/L — SIGNIFICANT CHANGE UP (ref 135–145)
SODIUM SERPL-SCNC: 142 MMOL/L — SIGNIFICANT CHANGE UP (ref 135–145)
SODIUM SERPL-SCNC: 142 MMOL/L — SIGNIFICANT CHANGE UP (ref 135–145)

## 2019-09-29 PROCEDURE — 99232 SBSQ HOSP IP/OBS MODERATE 35: CPT

## 2019-09-29 PROCEDURE — 99233 SBSQ HOSP IP/OBS HIGH 50: CPT

## 2019-09-29 RX ORDER — CHOLECALCIFEROL (VITAMIN D3) 125 MCG
1 CAPSULE ORAL
Qty: 0 | Refills: 0 | DISCHARGE

## 2019-09-29 RX ORDER — ASPIRIN/CALCIUM CARB/MAGNESIUM 324 MG
1 TABLET ORAL
Qty: 0 | Refills: 0 | DISCHARGE

## 2019-09-29 RX ORDER — TRAMADOL HYDROCHLORIDE 50 MG/1
0.5 TABLET ORAL
Qty: 30 | Refills: 0
Start: 2019-09-29

## 2019-09-29 RX ORDER — HYDROCORTISONE 20 MG
1 TABLET ORAL
Qty: 30 | Refills: 0
Start: 2019-09-29

## 2019-09-29 RX ORDER — SODIUM CHLORIDE 0.65 %
1 AEROSOL, SPRAY (ML) NASAL
Qty: 1 | Refills: 0
Start: 2019-09-29

## 2019-09-29 RX ORDER — GINKGO BILOBA 40 MG
0 CAPSULE ORAL
Qty: 0 | Refills: 0 | DISCHARGE

## 2019-09-29 RX ORDER — HYDROCORTISONE 20 MG
10 TABLET ORAL DAILY
Refills: 0 | Status: DISCONTINUED | OUTPATIENT
Start: 2019-09-29 | End: 2019-09-30

## 2019-09-29 RX ORDER — HYDROCORTISONE 20 MG
5 TABLET ORAL
Refills: 0 | Status: DISCONTINUED | OUTPATIENT
Start: 2019-09-29 | End: 2019-09-30

## 2019-09-29 RX ORDER — OMEGA-3 ACID ETHYL ESTERS 1 G
0 CAPSULE ORAL
Qty: 0 | Refills: 0 | DISCHARGE

## 2019-09-29 RX ORDER — ACETAMINOPHEN 500 MG
3 TABLET ORAL
Qty: 0 | Refills: 0 | DISCHARGE
Start: 2019-09-29

## 2019-09-29 RX ORDER — HYDROCORTISONE 20 MG
5 TABLET ORAL DAILY
Refills: 0 | Status: DISCONTINUED | OUTPATIENT
Start: 2019-09-29 | End: 2019-09-29

## 2019-09-29 RX ORDER — ACETAMINOPHEN 500 MG
975 TABLET ORAL EVERY 6 HOURS
Refills: 0 | Status: DISCONTINUED | OUTPATIENT
Start: 2019-09-29 | End: 2019-09-30

## 2019-09-29 RX ORDER — OXYMETAZOLINE HYDROCHLORIDE 0.5 MG/ML
1 SPRAY NASAL
Qty: 1 | Refills: 0
Start: 2019-09-29

## 2019-09-29 RX ADMIN — Medication 975 MILLIGRAM(S): at 18:34

## 2019-09-29 RX ADMIN — TRAMADOL HYDROCHLORIDE 50 MILLIGRAM(S): 50 TABLET ORAL at 09:30

## 2019-09-29 RX ADMIN — Medication 1 SPRAY(S): at 06:28

## 2019-09-29 RX ADMIN — SENNA PLUS 2 TABLET(S): 8.6 TABLET ORAL at 21:15

## 2019-09-29 RX ADMIN — OXYMETAZOLINE HYDROCHLORIDE 1 SPRAY(S): 0.5 SPRAY NASAL at 06:28

## 2019-09-29 RX ADMIN — Medication 975 MILLIGRAM(S): at 11:16

## 2019-09-29 RX ADMIN — TRAMADOL HYDROCHLORIDE 50 MILLIGRAM(S): 50 TABLET ORAL at 06:36

## 2019-09-29 RX ADMIN — Medication 975 MILLIGRAM(S): at 18:04

## 2019-09-29 RX ADMIN — Medication 975 MILLIGRAM(S): at 10:46

## 2019-09-29 RX ADMIN — Medication 1 SPRAY(S): at 18:04

## 2019-09-29 RX ADMIN — Medication 100 MILLIGRAM(S): at 18:03

## 2019-09-29 RX ADMIN — Medication 10 MILLIGRAM(S): at 06:30

## 2019-09-29 RX ADMIN — OXYMETAZOLINE HYDROCHLORIDE 1 SPRAY(S): 0.5 SPRAY NASAL at 18:03

## 2019-09-29 RX ADMIN — Medication 5 MILLIGRAM(S): at 15:25

## 2019-09-29 RX ADMIN — TRAMADOL HYDROCHLORIDE 50 MILLIGRAM(S): 50 TABLET ORAL at 07:15

## 2019-09-29 RX ADMIN — TRAMADOL HYDROCHLORIDE 25 MILLIGRAM(S): 50 TABLET ORAL at 21:00

## 2019-09-29 RX ADMIN — TRAMADOL HYDROCHLORIDE 25 MILLIGRAM(S): 50 TABLET ORAL at 03:24

## 2019-09-29 RX ADMIN — TRAMADOL HYDROCHLORIDE 50 MILLIGRAM(S): 50 TABLET ORAL at 09:00

## 2019-09-29 RX ADMIN — ENOXAPARIN SODIUM 40 MILLIGRAM(S): 100 INJECTION SUBCUTANEOUS at 18:03

## 2019-09-29 RX ADMIN — AMLODIPINE BESYLATE 5 MILLIGRAM(S): 2.5 TABLET ORAL at 06:30

## 2019-09-29 RX ADMIN — TRAMADOL HYDROCHLORIDE 25 MILLIGRAM(S): 50 TABLET ORAL at 22:00

## 2019-09-29 RX ADMIN — Medication 1 SPRAY(S): at 00:16

## 2019-09-29 RX ADMIN — LOSARTAN POTASSIUM 100 MILLIGRAM(S): 100 TABLET, FILM COATED ORAL at 06:29

## 2019-09-29 RX ADMIN — Medication 1 SPRAY(S): at 12:00

## 2019-09-29 RX ADMIN — Medication 100 MILLIGRAM(S): at 06:31

## 2019-09-29 RX ADMIN — TAMSULOSIN HYDROCHLORIDE 0.4 MILLIGRAM(S): 0.4 CAPSULE ORAL at 21:15

## 2019-09-29 RX ADMIN — TRAMADOL HYDROCHLORIDE 25 MILLIGRAM(S): 50 TABLET ORAL at 04:20

## 2019-09-29 NOTE — PROGRESS NOTE ADULT - PROBLEM SELECTOR PLAN 2
s/p surgery, patient developed high volume UOP >300 cc/hr now resolved.  - s/p DDAVP x 1 overnight with interval improvement in urine output. no further doses of DDAVP required  - Endo following, recs reviewed  - strict I/Os  - monitor BMP q6h
s/p surgery, patient developed high volume UOP >300 cc/hr  - labs reviewed: normal serum Na, uOSM 156 and dilute with spec grav 1.005, sOSM 295 a bit concentrated overall consistent with central DI with intact thirst response  - s/p DDAVP x 1 overnight with interval improvement in urine ouput  - Endo consulted, awaiting recs and eval  - strict I/Os  - monitor BMP q6h

## 2019-09-29 NOTE — PROGRESS NOTE ADULT - REASON FOR ADMISSION
sellar mass
transphenoidal resection of pituitary macroadenoma
endoscopic transphenoidal or transnasal resection of pituitary tumor

## 2019-09-29 NOTE — PROGRESS NOTE ADULT - ASSESSMENT
60 YO M with PMH of HTN, MATEO, Prostate CA [2018], in remission. Pt with blurry vision, found to have Pituitary Adenoma, S/P Trans Sphenoidal Resection of Pituitary Adenoma POD #3. . Pt had CSF leak in Intra op, repaired with right thigh fat graft and nasopore. Pt was evaluated at bedside, HA improving, c/o b/l Nasal congestion. No CSF leak on exam.

## 2019-09-29 NOTE — PROGRESS NOTE ADULT - SUBJECTIVE AND OBJECTIVE BOX
SUBJECTIVE: Patient seen and examined.  No DI overnight.  Not thirsty. no leakage from nose       Vital Signs Last 24 Hrs  T(C): 37.2 (29 Sep 2019 04:43), Max: 37.2 (29 Sep 2019 04:43)  T(F): 99 (29 Sep 2019 04:43), Max: 99 (29 Sep 2019 04:43)  HR: 82 (29 Sep 2019 04:43) (70 - 88)  BP: 138/79 (29 Sep 2019 04:43) (116/68 - 148/79)  BP(mean): --  RR: 18 (29 Sep 2019 04:43) (18 - 18)  SpO2: 94% (29 Sep 2019 04:43) (93% - 97%)    PHYSICAL EXAM:    Constitutional: No Acute Distress     Neurological: AOx3, Following Commands, Moving all Extremities bitemporal hemianopsia     Motor exam:          Upper extremity                         Delt     Bicep     Tricep    HG                                                 R         5/5        5/5        5/5       5/5                                               L          5/5        5/5        5/5       5/5          Lower extremity                        HF         KF        KE       DF         PF                                                  R        5/5        5/5        5/5       5/5         5/5                                               L         5/5        5/5       5/5       5/5          5/5                                                 Sensation: [x] intact to light touch  [] decreased:     Pulmonary: Clear to Auscultation, No rales, No rhonchi, No wheezes     Cardiovascular: S1, S2, Regular rate and rhythm     Gastrointestinal: Soft, Non-tender, Non-distended     Extremities: No calf tenderness     Incision: no leakage from nose   LABS:                        11.9   11.18 )-----------( 304      ( 28 Sep 2019 11:42 )             38.4    09-29    142  |  104  |  15  ----------------------------<  97  4.2   |  26  |  1.08    Ca    9.7      29 Sep 2019 06:19  Phos  3.7     09-27  Mg     2.5     09-27 09-28 @ 07:01  -  09-29 @ 07:00  --------------------------------------------------------  IN: 2080 mL / OUT: 2585 mL / NET: -505 mL        MEDICATIONS:  Anticoagulation:   enoxaparin Injectable 40 milliGRAM(s) SubCutaneous <User Schedule>    Antibiotics:    Endo:  hydrocortisone 10 milliGRAM(s) Oral daily  hydrocortisone 5 milliGRAM(s) Oral daily    Neuro:  acetaminophen   Tablet .. 650 milliGRAM(s) Oral every 6 hours PRN Temp greater or equal to 38C (100.4F), Mild Pain (1 - 3)  ondansetron Injectable 4 milliGRAM(s) IV Push every 6 hours PRN Nausea and/or Vomiting  traMADol 25 milliGRAM(s) Oral every 4 hours PRN Moderate Pain (4 - 6)  traMADol 50 milliGRAM(s) Oral every 4 hours PRN Severe Pain (7 - 10)    Cardiac:  amLODIPine   Tablet 5 milliGRAM(s) Oral daily  losartan 100 milliGRAM(s) Oral daily  tamsulosin 0.4 milliGRAM(s) Oral at bedtime    Pulm:  loratadine 10 milliGRAM(s) Oral daily    GI/:  docusate sodium 100 milliGRAM(s) Oral two times a day  senna 2 Tablet(s) Oral at bedtime    Other:   influenza   Vaccine 0.5 milliLiter(s) IntraMuscular once  oxymetazoline 0.05% Nasal Spray 1 Spray(s) Both Nostrils two times a day  sodium chloride 0.65% Nasal 1 Spray(s) Both Nostrils four times a day    DIET: regular

## 2019-09-29 NOTE — DISCHARGE NOTE PROVIDER - NSDCACTIVITY_GEN_ALL_CORE
Bathing allowed/Do not drive or operate machinery/No heavy lifting/straining/Do not make important decisions/Stairs allowed/Walking - Outdoors allowed/Showering allowed/Walking - Indoors allowed

## 2019-09-29 NOTE — DISCHARGE NOTE PROVIDER - HOSPITAL COURSE
59 year old male with history of hypertension, sleep apnea, prostate cancer 2018 on remission.  Patient went to see ophthalmologist due to vision disorder. Patient  examined  sent for xray which was positive for pituitary tumor. On 9/26/2019 transphenoidal resection of tumor with intraoperative CSF leak and right thigh fat graft .  Patient had a transient episode of DI on 9/27 and given DDAVp.  On 9/28 endocrinology saw the patient and recommended hydrocortisone 10/5.  Patient had no signs of CSF leak and was seen by physical therapy and recommended for home no needs.

## 2019-09-29 NOTE — DISCHARGE NOTE PROVIDER - PROVIDER TOKENS
PROVIDER:[TOKEN:[[id number]]],PROVIDER:[TOKEN:[[id number]]],PROVIDER:[TOKEN:[[id number]]]

## 2019-09-29 NOTE — PROGRESS NOTE ADULT - SUBJECTIVE AND OBJECTIVE BOX
Chief Complaint: transphenoidal resection and pituitary macroadenoma with postop DI and AI    History: Patient seen and examined at bedside. Reports he has continued headache. Does not report increased thirst or urination overnight. Did not need to receive DDAVP overnight. Started on hydrocortisone 10mg qam and 5mg q3pm. Patient reports decreased appetite as his mouth is dry and continued nasal congestion. Patient does not report any fever, SOB, chest pain, nausea or vomiting.     MEDICATIONS  (STANDING):  amLODIPine   Tablet 5 milliGRAM(s) Oral daily  docusate sodium 100 milliGRAM(s) Oral two times a day  enoxaparin Injectable 40 milliGRAM(s) SubCutaneous <User Schedule>  hydrocortisone 10 milliGRAM(s) Oral daily  hydrocortisone 5 milliGRAM(s) Oral <User Schedule>  influenza   Vaccine 0.5 milliLiter(s) IntraMuscular once  loratadine 10 milliGRAM(s) Oral daily  losartan 100 milliGRAM(s) Oral daily  oxymetazoline 0.05% Nasal Spray 1 Spray(s) Both Nostrils two times a day  senna 2 Tablet(s) Oral at bedtime  sodium chloride 0.65% Nasal 1 Spray(s) Both Nostrils four times a day  tamsulosin 0.4 milliGRAM(s) Oral at bedtime    MEDICATIONS  (PRN):  acetaminophen   Tablet .. 975 milliGRAM(s) Oral every 6 hours PRN Temp greater or equal to 38C (100.4F), Mild Pain (1 - 3)  ondansetron Injectable 4 milliGRAM(s) IV Push every 6 hours PRN Nausea and/or Vomiting  traMADol 25 milliGRAM(s) Oral every 4 hours PRN Moderate Pain (4 - 6)  traMADol 50 milliGRAM(s) Oral every 4 hours PRN Severe Pain (7 - 10)      PHYSICAL EXAM:  VITALS: T(C): 36.7 (09-29-19 @ 12:20)  T(F): 98 (09-29-19 @ 12:20), Max: 99 (09-29-19 @ 04:43)  HR: 68 (09-29-19 @ 12:20) (68 - 82)  BP: 124/73 (09-29-19 @ 12:20) (124/73 - 155/81)  RR:  (16 - 18)  SpO2:  (94% - 97%)  Wt(kg): 94kg   GENERAL: NAD, well-groomed, well-developed  RESPIRATORY: Clear to auscultation bilaterally; No rales, rhonchi, wheezing, or rubs  CARDIOVASCULAR: Regular rate and rhythm; No murmurs; no peripheral edema  PSYCH: Alert and oriented x 3, reactive affect     09-29    139  |  101  |  13  ----------------------------<  89  4.0   |  30  |  1.04    EGFR if : 91  EGFR if non : 78    Ca    9.5      09-29  Mg     2.5     09-27  Phos  3.7     09-27

## 2019-09-29 NOTE — PROGRESS NOTE ADULT - ASSESSMENT
60yo M with history of sleep apnea, prostate cancer 2018 on remission admitted for transphenoidal resection of pituitary tumor on 9/26/19. On MRI patient noted to have sella mass 2.3x2.2x3cm extending into the suprasellar cistern compressing optic chiasm. Endocrine consult requested for management of diabetes insipidus. After surgery patient reports increased thirst, increased urination and headache. Patient noted to have i/o 3225/2370 mL in last 24hours. urine osm 156. Specific gravity 1.005. serum Na 145. Patient was given DDAVP 1mcg 6am.       Nonfunctioning Pituitary Macroadenoma   S/p resection 9/26/19  follow up pathology   patient will need formal visual field testing   Please obtain FT4  Patient will follow up with Endocrinologist at [facility]     Central DI   currently reports headache and no increased thirst and urination overnight   I/O 2080/2585 over last 24 hours   Patient was given DDAVP 1mcgx 1 dose on 9/28/19 @5am for central DI   Monitor strict I/O  encourage oral hydration   Monitor for polyuria and polydipsia , urine outpt >250 mL/hr, urine osm <300, increase in sodium  Monitor BMP for Na  Would continue DDAVP prn for now, if symptoms persist will consider standing DDAVP 0.5mg     Adrenal Insufficiency   9/28 and 9/27 AM Cortisol 2.2, outpt am cortisol 3.0  Recommend physiologic dosing of Hydrocortisone 10mg qam and 5mg q3pm  On discharge please discuss sick day rules with patient (if fever or under stress patient to increase hydrocortisone by 2 or 3 times dose)  consider medical alert bracelet   Patient will need to follow up closely with outpt endocrinologist

## 2019-09-29 NOTE — DISCHARGE NOTE PROVIDER - NSDCFUADDAPPT_GEN_ALL_CORE_FT
Follow up with Dr. Rodriguez in 1 week.  Please call office for appointment  Follow up with Endocrinology in 1 week. (Dr. Arrington).  Please call office for appointment  Follow up with Dr. Su on 10/9/2019

## 2019-09-29 NOTE — DISCHARGE NOTE PROVIDER - CARE PROVIDERS DIRECT ADDRESSES
[email or web address]

## 2019-09-29 NOTE — DISCHARGE NOTE PROVIDER - NSDCCPTREATMENT_GEN_ALL_CORE_FT
PRINCIPAL PROCEDURE  Procedure: Endoscopic transphenoidal or transnasal resection of pituitary tumor  Findings and Treatment:

## 2019-09-29 NOTE — PROGRESS NOTE ADULT - PROBLEM SELECTOR PLAN 6
DVT ppx: lovenox    Dispo: PT recommending outpatient PT
DVT ppx: lovenox    Dispo: PT recommending outpatient PT  possible d/c home today per Neurosurgery

## 2019-09-29 NOTE — PROGRESS NOTE ADULT - PROBLEM SELECTOR PLAN 5
patient states has chronic constipation at home  - continue senna + colace  - can add miralax if no BM today
patient states has chronic constipation at home. last BM 6 days ago  - continue senna + colace  - can add miralax or enema if no BM today

## 2019-09-29 NOTE — DISCHARGE NOTE PROVIDER - NSDCFUSCHEDAPPT_GEN_ALL_CORE_FT
JOE SNYDER ; 10/09/2019 ; NPP Ot[facility] JOE SNYDER ; 10/09/2019 ; NPP Otolaryng [address] [facility]

## 2019-09-29 NOTE — DISCHARGE NOTE PROVIDER - NSDCCPCAREPLAN_GEN_ALL_CORE_FT
PRINCIPAL DISCHARGE DIAGNOSIS  Diagnosis: Pituitary macroadenoma  Assessment and Plan of Treatment: 9/26/2019 transphenoidal resection of tumor with intraoperative CSF leak and right thigh fat graft      SECONDARY DISCHARGE DIAGNOSES  Diagnosis: Cortisol deficiency  Assessment and Plan of Treatment: you are to continue your hydrocortisone as prescribed

## 2019-09-29 NOTE — PROGRESS NOTE ADULT - ATTENDING COMMENTS
Agree with assessment and plan as above by Dr. Tomlin. Reviewed all pertinent labs, glucose values, and imaging studies. Modifications made as indicated above.   Patient was seen and examined independently by me as well after discussion of the case.   Regarding DI, urine output and sodium  has been stable.  Did not require any further DDAVP treatment, discussed with patient to continue monitor urine output, if greater than 300cc/hr to check statu bmp and urine osmol, specific gravity and does DDAVP if needed.   Regarding AI, patient was started on steroid replacement.  To recheck HPA axis as outpatient.   Patient was also educated on signs of SIADH, including sudden weight gain, change in mental status, patient knows to call endocrinologist or neuro surgery team to get stat lab as outpatient.   To follow up with endo within 2 weeks post discharge.   Cortez Arrington MD  [phone]

## 2019-09-29 NOTE — DISCHARGE NOTE PROVIDER - NSDCFUADDINST_GEN_ALL_CORE_FT
Please avoid nasal trauma, do not blow your nose, use straws or incentive spirometers. If you notice nasal drainage, post nasal drainage, or metallic/salty taste please notify your neurosurgeon and/or ENT.  please do not engage in strenuous activity, heavy lifting, drive, or return to work or school until cleared by surgeon.  please keep incision clean and dry, do not submerge wound in water for prolonged periods of time, pat dry after showering, and do not use any creams or ointments to incision.

## 2019-09-29 NOTE — PROGRESS NOTE ADULT - PROBLEM SELECTOR PLAN 3
AM cortisol 2.2 last two days in setting of pituitary macroadenoma resection  - Per further history, pt has had outpatient testing reportedly also showing low AM cortisol; he also has had serial PET scans as part of his prior cancer surveillance that has recently demonstrated a small adrenal lesion that has not been worked up or intervened on  - Endo following, started on physiologic dosing of hydrcortisone 10mg qAM and 5mg qHS  - patient will need outpatient follow-up with his Endocrinologist upon discharge
AM cortisol 2.2 last two days in setting of pitutuary macroadenoma resection  - Per further history, pt has had outpatient testing reportedly also showing low AM cortisol; he also has had serial PET scans as part of his prior cancer surveillance that has recently demonstrated a small adrenal lesion that has not been worked up or intervened on  - suggest primary neurosx team reach out to patient's endocrinologist Dr. Linwood Posadas to discuss what workup has already been done and to obtain records.  Per Dr. Rodriguez's outpatient consult note, it appears he was diagnosed with a non-functioning macroadenoma  - Endo consulted today, awaiting eval and recs regarding further work-up

## 2019-09-29 NOTE — PROGRESS NOTE ADULT - SUBJECTIVE AND OBJECTIVE BOX
Carolann Hooker MD ([facility] Hospitalist)  Pager: [phone]  (During off hours please page [phone])      Patient is a 59y old  Male who presents with a chief complaint of 9/26/2019 transphenoidal resection of tumor with intraoperative CSF leak and right thigh fat graft (29 Sep 2019 10:49)      SUBJECTIVE / OVERNIGHT EVENTS: No acute events overnight. UOP significantly improved/decreased < 100cc/hr. No further doses of DDAVP required. Seen by Endo yesterday and started on hydrocortisone 10/5 for adrenal insufficiency. Endorses mild headache still improved with tramadol. Potential discharge to home today per Neurosurgery.    MEDICATIONS  (STANDING):  amLODIPine   Tablet 5 milliGRAM(s) Oral daily  docusate sodium 100 milliGRAM(s) Oral two times a day  enoxaparin Injectable 40 milliGRAM(s) SubCutaneous <User Schedule>  hydrocortisone 10 milliGRAM(s) Oral daily  hydrocortisone 5 milliGRAM(s) Oral <User Schedule>  influenza   Vaccine 0.5 milliLiter(s) IntraMuscular once  loratadine 10 milliGRAM(s) Oral daily  losartan 100 milliGRAM(s) Oral daily  oxymetazoline 0.05% Nasal Spray 1 Spray(s) Both Nostrils two times a day  senna 2 Tablet(s) Oral at bedtime  sodium chloride 0.65% Nasal 1 Spray(s) Both Nostrils four times a day  tamsulosin 0.4 milliGRAM(s) Oral at bedtime    MEDICATIONS  (PRN):  acetaminophen   Tablet .. 975 milliGRAM(s) Oral every 6 hours PRN Temp greater or equal to 38C (100.4F), Mild Pain (1 - 3)  ondansetron Injectable 4 milliGRAM(s) IV Push every 6 hours PRN Nausea and/or Vomiting  traMADol 25 milliGRAM(s) Oral every 4 hours PRN Moderate Pain (4 - 6)  traMADol 50 milliGRAM(s) Oral every 4 hours PRN Severe Pain (7 - 10)      Vital Signs Last 24 Hrs  T(C): 36.4 (29 Sep 2019 08:34), Max: 37.2 (29 Sep 2019 04:43)  T(F): 97.5 (29 Sep 2019 08:34), Max: 99 (29 Sep 2019 04:43)  HR: 80 (29 Sep 2019 10:47) (70 - 82)  BP: 132/81 (29 Sep 2019 10:47) (116/68 - 155/81)  BP(mean): --  RR: 16 (29 Sep 2019 10:47) (16 - 18)  SpO2: 94% (29 Sep 2019 10:47) (94% - 97%)  CAPILLARY BLOOD GLUCOSE        I&O's Summary    28 Sep 2019 07:01  -  29 Sep 2019 07:00  --------------------------------------------------------  IN: 2080 mL / OUT: 2585 mL / NET: -505 mL    29 Sep 2019 07:01  -  29 Sep 2019 11:37  --------------------------------------------------------  IN: 240 mL / OUT: 200 mL / NET: 40 mL        PHYSICAL EXAM:  GENERAL: NAD, well-developed  HEAD:  Atraumatic, Normocephalic  ENT: +moist mucous membranes +dry crusted blood in nares b/l  EYES: EOMI, PERRLA, conjunctiva and sclera clear  NECK: Supple, No JVD  CHEST/LUNG: Clear to auscultation bilaterally; No wheeze nor rhonchi, improved from day prior  HEART: Regular rate and rhythm; No murmurs, rubs, or gallops  ABDOMEN: Soft, Nontender, Nondistended; Bowel sounds present  EXTREMITIES:  2+ Peripheral Pulses, No clubbing, cyanosis, or edema  PSYCH: Cooperative, Normal mood and affect, Normal judgment  NEUROLOGY: non-focal, AOx3  SKIN: No rashes or lesions      LABS:                        11.9   11.18 )-----------( 304      ( 28 Sep 2019 11:42 )             38.4     09-29    142  |  104  |  15  ----------------------------<  97  4.2   |  26  |  1.08    Ca    9.7      29 Sep 2019 06:19        RADIOLOGY & ADDITIONAL TESTS:    Imaging Personally Reviewed:     Consultant(s) Notes Reviewed: Endocrinology, ENT, Neurosurgery    Care Discussed with Consultants/Other Providers: Neurosurgery

## 2019-09-29 NOTE — DISCHARGE NOTE PROVIDER - CARE PROVIDER_API CALL
Jameson Rodriguez)  Neurological Surgery  [address], [address]  [place] [address]  Phone: [phone]  Fax: [phone]  Follow Up Time:     Cortez Arrington)  Internal Medicine  [address] [place] [address]  Phone: [phone]  Fax: [phone]  Follow Up Time:     Lucy Su)  Otolaryngology Facial Plastics  [address], [facility] [[email or web address] [address]  [place] [address]  Phone: [phone]  Fax: [phone]  Follow Up Time:

## 2019-09-29 NOTE — PROGRESS NOTE ADULT - ASSESSMENT
59 year old male with hx of htn, sleep apnea, prostate cancer 2018 on remission. Patient went to see ophthalmologist due to vision disorder. Patient sent for imaging which shown a pituitary tumor. Patient  was referred to Dr Rodriguez and underwent and Endoscopic transphenoidal resection of pituitary tumor on 9/26.    PROCEDURE: S/P Endoscopic transphenoidal or transnasal resection of pituitary tumor     POD# 3    PLAN:  NEURO: continue neuro checks q 4 hrs, monitor for CSF leak, DI watch, post op MRI shows no residual  PULM: Room air, NO incentive spirometer  CV: HTN: continue amlodipine, losartan  ENDO: Post op DI, received DDAVp 1mcg, continue to monitor strict I's/O's, BMP q 6, Endo consult called recommended no more DDAVP but due to low cortisone started on hydrocortisone 10/5   HEME/ONC:   H/H stable           DVT ppx: [X] SQL [] SQH [X] Venodynes bilaterally   RENAL: IVL, continue flomax for BPH  ID: afebrile  GI: regular diet, bowel regimen  DISCHARGE PLANNING: no skilled needs possible dc home today       Assessment:  Please Check When Present   []  GCS  E   V  M     Heart Failure: []Acute, [] acute on chronic , []chronic  Heart Failure:  [] Diastolic (HFpEF), [] Systolic (HFrEF), []Combined (HFpEF and HFrEF), [] RHF, [] Pulm HTN, [] Other    [] CARLOZ, [] ATN, [] AIN, [] other  [] CKD1, [] CKD2, [] CKD 3, [] CKD 4, [] CKD 5, []ESRD    Encephalopathy: [] Metabolic, [] Hepatic, [] toxic, [] Neurological, [] Other    Abnormal Nurtitional Status: [] malnurtition (see nutrition note), [ ]underweight: BMI < 19, [] morbid obesity: BMI >40, [] Cachexia    [] Sepsis  [] hypovolemic shock,[] cardiogenic shock, [] hemorrhagic shock, [] neuogenic shock  [] Acute Respiratory Failure  []Cerebral edema, [] Brain compression/ herniation,   [] Functional quadriplegia  [] Acute blood loss anemia    Will discuss plan with Dr. Rodriguez  Spectralink # [id number]

## 2019-09-29 NOTE — PROGRESS NOTE ADULT - SUBJECTIVE AND OBJECTIVE BOX
ENT ISSUE/POD:  S/P Trans Sphenoidal Resection of Pituitary Adenoma POD #3.    HPI: 60 YO M with PMH of HTN, MATEO, Prostate CA [2018], in remission. Pt with blurry vision, found to have Pituitary Adenoma, S/P Trans Sphenoidal Resection of Pituitary Adenoma POD #3. Pt had CSF leak in Intra op, repaired with right thigh fat graft and naso pore. Pt was evaluated at bedside, HA improving, c/o b/l Nasal congestion . Denies, nasal discharge, salty taste in mouth, Dizziness/ Syncope, fever/ chills, N/V.          PAST MEDICAL & SURGICAL HISTORY:  Sleep apnea: uses oral device x 10 years  Acute diverticulitis: hx perforation 2008 had colon resection with colostomy and reversal 2009  Prostate cancer: had cyber knife treatment on remission 2018  Hypertension: on medication  Benign parotid tumor: excision 2009  History of torn meniscus of left knee: 1995  Bladder tumor: excision  2014  H/O arthroscopy of knee: right knee 1995  History of colostomy reversal: 2009 /colon resection 2008    Allergies    No Known Allergies    Intolerances      MEDICATIONS  (STANDING):  amLODIPine   Tablet 5 milliGRAM(s) Oral daily  docusate sodium 100 milliGRAM(s) Oral two times a day  enoxaparin Injectable 40 milliGRAM(s) SubCutaneous <User Schedule>  hydrocortisone 10 milliGRAM(s) Oral daily  hydrocortisone 5 milliGRAM(s) Oral daily  influenza   Vaccine 0.5 milliLiter(s) IntraMuscular once  loratadine 10 milliGRAM(s) Oral daily  losartan 100 milliGRAM(s) Oral daily  oxymetazoline 0.05% Nasal Spray 1 Spray(s) Both Nostrils two times a day  senna 2 Tablet(s) Oral at bedtime  sodium chloride 0.65% Nasal 1 Spray(s) Both Nostrils four times a day  tamsulosin 0.4 milliGRAM(s) Oral at bedtime    MEDICATIONS  (PRN):  acetaminophen   Tablet .. 650 milliGRAM(s) Oral every 6 hours PRN Temp greater or equal to 38C (100.4F), Mild Pain (1 - 3)  ondansetron Injectable 4 milliGRAM(s) IV Push every 6 hours PRN Nausea and/or Vomiting  traMADol 25 milliGRAM(s) Oral every 4 hours PRN Moderate Pain (4 - 6)  traMADol 50 milliGRAM(s) Oral every 4 hours PRN Severe Pain (7 - 10)      Social History: see consult    Family history: see consult    ROS:   ENT: all negative except as noted in HPI   Pulm: denies SOB, cough, hemoptysis  Neuro: denies numbness/tingling, loss of sensation  Endo: denies heat/cold intolerance, excessive sweating      Vital Signs Last 24 Hrs  T(C): 36.4 (29 Sep 2019 08:34), Max: 37.2 (29 Sep 2019 04:43)  T(F): 97.5 (29 Sep 2019 08:34), Max: 99 (29 Sep 2019 04:43)  HR: 82 (29 Sep 2019 08:34) (70 - 82)  BP: 155/81 (29 Sep 2019 08:34) (116/68 - 155/81)  BP(mean): --  RR: 16 (29 Sep 2019 08:34) (16 - 18)  SpO2: 96% (29 Sep 2019 08:34) (94% - 97%)                          11.9   11.18 )-----------( 304      ( 28 Sep 2019 11:42 )             38.4    09-29    142  |  104  |  15  ----------------------------<  97  4.2   |  26  |  1.08    Ca    9.7      29 Sep 2019 06:19  Phos  3.7     09-27  Mg     2.5     09-27         PHYSICAL EXAM:  Gen: NAD, On RA.   Skin: No rashes, bruises, or lesions.  Head: Normocephalic, Atraumatic.  Face: no edema, erythema, or fluctuance. Parotid glands soft without mass.  Eyes: no scleral injection.  Nose: Crusted blood in b/l Nares, not actively bleeding.   Mouth: No Stridor / Drooling / Trismus.  Mucosa moist, tongue/uvula midline, oropharynx clear.   Neck: Flat, supple, no lymphadenopathy, trachea midline, no masses.  Lymphatic: No lymphadenopathy.  Resp: breathing easily, no stridor  Neuro: facial nerve intact, no facial droop.

## 2019-09-30 ENCOUNTER — TRANSCRIPTION ENCOUNTER (OUTPATIENT)
Age: 59
End: 2019-09-30

## 2019-09-30 VITALS
DIASTOLIC BLOOD PRESSURE: 84 MMHG | SYSTOLIC BLOOD PRESSURE: 139 MMHG | HEART RATE: 66 BPM | OXYGEN SATURATION: 97 % | TEMPERATURE: 97 F | RESPIRATION RATE: 18 BRPM

## 2019-09-30 LAB
ANION GAP SERPL CALC-SCNC: 10 MMOL/L — SIGNIFICANT CHANGE UP (ref 5–17)
ANION GAP SERPL CALC-SCNC: 10 MMOL/L — SIGNIFICANT CHANGE UP (ref 5–17)
BUN SERPL-MCNC: 13 MG/DL — SIGNIFICANT CHANGE UP (ref 7–23)
BUN SERPL-MCNC: 14 MG/DL — SIGNIFICANT CHANGE UP (ref 7–23)
CALCIUM SERPL-MCNC: 9.4 MG/DL — SIGNIFICANT CHANGE UP (ref 8.4–10.5)
CALCIUM SERPL-MCNC: 9.8 MG/DL — SIGNIFICANT CHANGE UP (ref 8.4–10.5)
CHLORIDE SERPL-SCNC: 103 MMOL/L — SIGNIFICANT CHANGE UP (ref 96–108)
CHLORIDE SERPL-SCNC: 106 MMOL/L — SIGNIFICANT CHANGE UP (ref 96–108)
CO2 SERPL-SCNC: 26 MMOL/L — SIGNIFICANT CHANGE UP (ref 22–31)
CO2 SERPL-SCNC: 28 MMOL/L — SIGNIFICANT CHANGE UP (ref 22–31)
CREAT SERPL-MCNC: 0.89 MG/DL — SIGNIFICANT CHANGE UP (ref 0.5–1.3)
CREAT SERPL-MCNC: 0.95 MG/DL — SIGNIFICANT CHANGE UP (ref 0.5–1.3)
GLUCOSE SERPL-MCNC: 101 MG/DL — HIGH (ref 70–99)
GLUCOSE SERPL-MCNC: 111 MG/DL — HIGH (ref 70–99)
POTASSIUM SERPL-MCNC: 4.1 MMOL/L — SIGNIFICANT CHANGE UP (ref 3.5–5.3)
POTASSIUM SERPL-MCNC: 4.4 MMOL/L — SIGNIFICANT CHANGE UP (ref 3.5–5.3)
POTASSIUM SERPL-SCNC: 4.1 MMOL/L — SIGNIFICANT CHANGE UP (ref 3.5–5.3)
POTASSIUM SERPL-SCNC: 4.4 MMOL/L — SIGNIFICANT CHANGE UP (ref 3.5–5.3)
SODIUM SERPL-SCNC: 141 MMOL/L — SIGNIFICANT CHANGE UP (ref 135–145)
SODIUM SERPL-SCNC: 142 MMOL/L — SIGNIFICANT CHANGE UP (ref 135–145)
T4 FREE SERPL-MCNC: 1.3 NG/DL — SIGNIFICANT CHANGE UP (ref 0.9–1.8)

## 2019-09-30 PROCEDURE — 84100 ASSAY OF PHOSPHORUS: CPT

## 2019-09-30 PROCEDURE — 86900 BLOOD TYPING SEROLOGIC ABO: CPT

## 2019-09-30 PROCEDURE — 83930 ASSAY OF BLOOD OSMOLALITY: CPT

## 2019-09-30 PROCEDURE — C1889: CPT

## 2019-09-30 PROCEDURE — 88342 IMHCHEM/IMCYTCHM 1ST ANTB: CPT

## 2019-09-30 PROCEDURE — 86850 RBC ANTIBODY SCREEN: CPT

## 2019-09-30 PROCEDURE — 97116 GAIT TRAINING THERAPY: CPT

## 2019-09-30 PROCEDURE — 83935 ASSAY OF URINE OSMOLALITY: CPT

## 2019-09-30 PROCEDURE — 83735 ASSAY OF MAGNESIUM: CPT

## 2019-09-30 PROCEDURE — 70553 MRI BRAIN STEM W/O & W/DYE: CPT

## 2019-09-30 PROCEDURE — 88305 TISSUE EXAM BY PATHOLOGIST: CPT

## 2019-09-30 PROCEDURE — 93970 EXTREMITY STUDY: CPT

## 2019-09-30 PROCEDURE — 97530 THERAPEUTIC ACTIVITIES: CPT

## 2019-09-30 PROCEDURE — A9585: CPT

## 2019-09-30 PROCEDURE — 88341 IMHCHEM/IMCYTCHM EA ADD ANTB: CPT

## 2019-09-30 PROCEDURE — 88360 TUMOR IMMUNOHISTOCHEM/MANUAL: CPT

## 2019-09-30 PROCEDURE — 97110 THERAPEUTIC EXERCISES: CPT

## 2019-09-30 PROCEDURE — 84439 ASSAY OF FREE THYROXINE: CPT

## 2019-09-30 PROCEDURE — C1769: CPT

## 2019-09-30 PROCEDURE — 80048 BASIC METABOLIC PNL TOTAL CA: CPT

## 2019-09-30 PROCEDURE — 86901 BLOOD TYPING SEROLOGIC RH(D): CPT

## 2019-09-30 PROCEDURE — 97161 PT EVAL LOW COMPLEX 20 MIN: CPT

## 2019-09-30 PROCEDURE — 70450 CT HEAD/BRAIN W/O DYE: CPT

## 2019-09-30 PROCEDURE — 85027 COMPLETE CBC AUTOMATED: CPT

## 2019-09-30 PROCEDURE — 81005 URINALYSIS: CPT

## 2019-09-30 PROCEDURE — 82533 TOTAL CORTISOL: CPT

## 2019-09-30 RX ADMIN — AMLODIPINE BESYLATE 5 MILLIGRAM(S): 2.5 TABLET ORAL at 06:02

## 2019-09-30 RX ADMIN — Medication 975 MILLIGRAM(S): at 09:37

## 2019-09-30 RX ADMIN — Medication 100 MILLIGRAM(S): at 06:02

## 2019-09-30 RX ADMIN — TRAMADOL HYDROCHLORIDE 25 MILLIGRAM(S): 50 TABLET ORAL at 07:00

## 2019-09-30 RX ADMIN — Medication 975 MILLIGRAM(S): at 01:30

## 2019-09-30 RX ADMIN — TRAMADOL HYDROCHLORIDE 25 MILLIGRAM(S): 50 TABLET ORAL at 06:02

## 2019-09-30 RX ADMIN — Medication 975 MILLIGRAM(S): at 00:52

## 2019-09-30 RX ADMIN — OXYMETAZOLINE HYDROCHLORIDE 1 SPRAY(S): 0.5 SPRAY NASAL at 06:03

## 2019-09-30 RX ADMIN — LOSARTAN POTASSIUM 100 MILLIGRAM(S): 100 TABLET, FILM COATED ORAL at 06:02

## 2019-09-30 RX ADMIN — Medication 10 MILLIGRAM(S): at 06:02

## 2019-09-30 RX ADMIN — Medication 1 SPRAY(S): at 06:03

## 2019-09-30 NOTE — PROGRESS NOTE ADULT - PROBLEM SELECTOR PROBLEM 1
Pituitary adenoma
Pituitary macroadenoma

## 2019-09-30 NOTE — PROGRESS NOTE ADULT - SUBJECTIVE AND OBJECTIVE BOX
ENT ISSUE/POD:  S/P Trans Sphenoidal Resection of Pituitary Adenoma POD #4.    HPI: 60 YO M with PMH of HTN, MATEO, Prostate CA [2018], in remission. Pt with blurry vision, found to have Pituitary Adenoma, S/P Trans Sphenoidal Resection of Pituitary Adenoma POD #3. Pt had CSF leak in Intra op, repaired with right thigh fat graft and naso pore. Pt was evaluated at bedside, HA improving, c/o b/l Nasal congestion . Denies, nasal discharge, salty taste in mouth, Dizziness/ Syncope, fever/ chills, N/V.          PAST MEDICAL & SURGICAL HISTORY:  Sleep apnea: uses oral device x 10 years  Acute diverticulitis: hx perforation 2008 had colon resection with colostomy and reversal 2009  Prostate cancer: had cyber knife treatment on remission 2018  Hypertension: on medication  Benign parotid tumor: excision 2009  History of torn meniscus of left knee: 1995  Bladder tumor: excision  2014  H/O arthroscopy of knee: right knee 1995  History of colostomy reversal: 2009 /colon resection 2008    Allergies    No Known Allergies    Intolerances      MEDICATIONS  (STANDING):  amLODIPine   Tablet 5 milliGRAM(s) Oral daily  docusate sodium 100 milliGRAM(s) Oral two times a day  enoxaparin Injectable 40 milliGRAM(s) SubCutaneous <User Schedule>  hydrocortisone 10 milliGRAM(s) Oral daily  hydrocortisone 5 milliGRAM(s) Oral <User Schedule>  influenza   Vaccine 0.5 milliLiter(s) IntraMuscular once  loratadine 10 milliGRAM(s) Oral daily  losartan 100 milliGRAM(s) Oral daily  oxymetazoline 0.05% Nasal Spray 1 Spray(s) Both Nostrils two times a day  senna 2 Tablet(s) Oral at bedtime  sodium chloride 0.65% Nasal 1 Spray(s) Both Nostrils four times a day  tamsulosin 0.4 milliGRAM(s) Oral at bedtime    MEDICATIONS  (PRN):  acetaminophen   Tablet .. 975 milliGRAM(s) Oral every 6 hours PRN Temp greater or equal to 38C (100.4F), Mild Pain (1 - 3)  ondansetron Injectable 4 milliGRAM(s) IV Push every 6 hours PRN Nausea and/or Vomiting  traMADol 25 milliGRAM(s) Oral every 4 hours PRN Moderate Pain (4 - 6)  traMADol 50 milliGRAM(s) Oral every 4 hours PRN Severe Pain (7 - 10)      Social History: see consult    Family history: see consult    ROS:   ENT: all negative except as noted in HPI   Pulm: denies SOB, cough, hemoptysis  Neuro: denies numbness/tingling, loss of sensation  Endo: denies heat/cold intolerance, excessive sweating      Vital Signs Last 24 Hrs  T(C): 36.8 (30 Sep 2019 04:50), Max: 37 (29 Sep 2019 20:10)  T(F): 98.3 (30 Sep 2019 04:50), Max: 98.6 (29 Sep 2019 20:10)  HR: 80 (30 Sep 2019 04:50) (68 - 82)  BP: 144/85 (30 Sep 2019 04:50) (116/71 - 155/81)  BP(mean): --  RR: 18 (30 Sep 2019 04:50) (16 - 18)  SpO2: 94% (30 Sep 2019 04:50) (94% - 97%)                          11.9   11.18 )-----------( 304      ( 28 Sep 2019 11:42 )             38.4    09-30    141  |  103  |  14  ----------------------------<  101<H>  4.4   |  28  |  0.95    Ca    9.8      30 Sep 2019 02:47         PHYSICAL EXAM:  Gen: NAD, On RA.   Skin: No rashes, bruises, or lesions.  Head: Normocephalic, Atraumatic.  Face: no edema, erythema, or fluctuance. Parotid glands soft without mass.  Eyes: no scleral injection.  Nose: Crusted blood in b/l Nares, not actively bleeding.   Mouth: No Stridor / Drooling / Trismus.  Mucosa moist, tongue/uvula midline, oropharynx clear.   Neck: Flat, supple, no lymphadenopathy, trachea midline, no masses.  Lymphatic: No lymphadenopathy.  Resp: breathing easily, no stridor  Neuro: facial nerve intact, no facial droop.

## 2019-09-30 NOTE — PROGRESS NOTE ADULT - PROBLEM SELECTOR PLAN 1
- humidified face tent  - nasal saline, 2 sprays to b/l nares 4 times a day.  - monitor for signs of Epistaxis and CSF leak  - avoid nasal trauma, heavy lifting and bending at waist.  - [facility]
- S/P TSRP POD # 0.   - Monitor for CSF leak.   - NS nasal spray to b/l Nares 4 times per day.   - Pain meds prn for HA.   - -140mmHg.   - MRI brain in AM.   - ENT will follow.   - Call with questions.     LE SMITH PA-C.   # [id number]  ENT PA.
- Monitor for CSF leak.   - NS nasal spray to b/l Nares 4 times per day.   - ENT will follow.   - Call with questions.
- Monitor for CSF leak.   - NS nasal spray to b/l Nares 4 times per day.   - ENT will follow.   - Call with questions.
- S/P TSRP POD # 2.   - Monitor for CSF leak.   - NS nasal spray to b/l Nares 4 times per day.   - Afrin BID.   - -140mmHg.   - ENT will follow.   - Call with questions.     LE SMITH PA-C.   # [id number]  ENT PA.
s/p transphenoidal resection on 9/25 c/b CSF lead intra-operative, s/p fat graft  - post-op care as per neurosurgery and ENT  - MRI post-op showing no gross residual tumor  - would resume aspirin when able to per neurosurgery  - nasal saline spray QID as per ENT
s/p transphenoidal resection on 9/25 c/b CSF lead intra-operative, s/p fat graft  - post-op care as per neurosurgery and ENT  - MRI post-op showing no gross residual tumor  - would resume aspirin when able to per neurosurgery  - nasal saline per ENT

## 2019-09-30 NOTE — DISCHARGE NOTE NURSING/CASE MANAGEMENT/SOCIAL WORK - PATIENT PORTAL LINK FT
You can access the FollowMyHealth Patient Portal offered by [facility] by registering at the following website: [email or web address]. By joining [employer]’s FollowMyHealth portal, you will also be able to view your health information using other applications (apps) compatible with our system.

## 2019-09-30 NOTE — PROGRESS NOTE ADULT - SUBJECTIVE AND OBJECTIVE BOX
SUBJECTIVE: Patient seen and examined.  Patient states headache alot better with 975 of tylenol       Vital Signs Last 24 Hrs  T(C): 36.3 (30 Sep 2019 08:10), Max: 37 (29 Sep 2019 20:10)  T(F): 97.4 (30 Sep 2019 08:10), Max: 98.6 (29 Sep 2019 20:10)  HR: 66 (30 Sep 2019 08:10) (66 - 82)  BP: 139/84 (30 Sep 2019 08:10) (116/71 - 155/81)  BP(mean): --  RR: 18 (30 Sep 2019 08:10) (16 - 18)  SpO2: 97% (30 Sep 2019 08:10) (94% - 97%)    PHYSICAL EXAM:    Constitutional: No Acute Distress     Neurological: AOx3, Following Commands, Moving all Extremities, bi temporal hemiopsia     Motor exam:          Upper extremity                         Delt     Bicep     Tricep    HG                                                 R         5/5        5/5        5/5       5/5                                               L          5/5        5/5        5/5       5/5          Lower extremity                        HF         KF        KE       DF         PF                                                  R        5/5        5/5        5/5       5/5         5/5                                               L         5/5        5/5       5/5       5/5          5/5                                                 Sensation: [x] intact to light touch  [] decreased:     Pulmonary: Clear to Auscultation, No rales, No rhonchi, No wheezes     Cardiovascular: S1, S2, Regular rate and rhythm     Gastrointestinal: Soft, Non-tender, Non-distended     Extremities: No calf tenderness     Incision: no drainage from then nose   LABS:                        11.9   11.18 )-----------( 304      ( 28 Sep 2019 11:42 )             38.4    09-30    142  |  106  |  13  ----------------------------<  111<H>  4.1   |  26  |  0.89    Ca    9.4      30 Sep 2019 06:30        09-29 @ 07:01  -  09-30 @ 07:00  --------------------------------------------------------  IN: 1720 mL / OUT: 2340 mL / NET: -620 mL      MEDICATIONS:  Anticoagulation:   enoxaparin Injectable 40 milliGRAM(s) SubCutaneous <User Schedule>    Antibiotics:    Endo:  hydrocortisone 10 milliGRAM(s) Oral daily  hydrocortisone 5 milliGRAM(s) Oral <User Schedule>    Neuro:  acetaminophen   Tablet .. 975 milliGRAM(s) Oral every 6 hours PRN Temp greater or equal to 38C (100.4F), Mild Pain (1 - 3)  ondansetron Injectable 4 milliGRAM(s) IV Push every 6 hours PRN Nausea and/or Vomiting  traMADol 25 milliGRAM(s) Oral every 4 hours PRN Moderate Pain (4 - 6)  traMADol 50 milliGRAM(s) Oral every 4 hours PRN Severe Pain (7 - 10)    Cardiac:  amLODIPine   Tablet 5 milliGRAM(s) Oral daily  losartan 100 milliGRAM(s) Oral daily  tamsulosin 0.4 milliGRAM(s) Oral at bedtime    Pulm:  loratadine 10 milliGRAM(s) Oral daily    GI/:  docusate sodium 100 milliGRAM(s) Oral two times a day  senna 2 Tablet(s) Oral at bedtime    Other:   influenza   Vaccine 0.5 milliLiter(s) IntraMuscular once  sodium chloride 0.65% Nasal 1 Spray(s) Both Nostrils four times a day    DIET: regular     IMAGING:

## 2019-09-30 NOTE — PROGRESS NOTE ADULT - ASSESSMENT
59 year old male with hx of htn, sleep apnea, prostate cancer 2018 on remission. Patient went to see ophthalmologist due to vision disorder. Patient sent for imaging which shown a pituitary tumor. Patient  was referred to Dr Rodriguez and underwent and Endoscopic transphenoidal resection of pituitary tumor on 9/26.    PROCEDURE: S/P Endoscopic transphenoidal or transnasal resection of pituitary tumor       PLAN:  NEURO: continue neuro checks q 4 hrs, monitor for CSF leak, DI watch, post op MRI shows no residual  PULM: Room air, NO incentive spirometer  CV: HTN: continue amlodipine, losartan  ENDO: Post op DI, received DDAVp 1mcg, continue to monitor strict I's/O's, BMP q 6, Endo consult called recommended no more DDAVP but due to low cortisone started on hydrocortisone 10/5   HEME/ONC:   H/H stable           DVT ppx: [X] SQL [] SQH [X] Venodynes bilaterally   RENAL: IVL, continue flomax for BPH  ID: afebrile  GI: regular diet, bowel regimen  DISCHARGE PLANNING: no skilled needs possible dc home today       Assessment:  Please Check When Present   []  GCS  E   V  M     Heart Failure: []Acute, [] acute on chronic , []chronic  Heart Failure:  [] Diastolic (HFpEF), [] Systolic (HFrEF), []Combined (HFpEF and HFrEF), [] RHF, [] Pulm HTN, [] Other    [] CARLOZ, [] ATN, [] AIN, [] other  [] CKD1, [] CKD2, [] CKD 3, [] CKD 4, [] CKD 5, []ESRD    Encephalopathy: [] Metabolic, [] Hepatic, [] toxic, [] Neurological, [] Other    Abnormal Nurtitional Status: [] malnurtition (see nutrition note), [ ]underweight: BMI < 19, [] morbid obesity: BMI >40, [] Cachexia    [] Sepsis  [] hypovolemic shock,[] cardiogenic shock, [] hemorrhagic shock, [] neuogenic shock  [] Acute Respiratory Failure  []Cerebral edema, [] Brain compression/ herniation,   [] Functional quadriplegia  [] Acute blood loss anemia    Will discuss plan with Dr. Rodriguez  Spectralink # [id number]

## 2019-09-30 NOTE — PROGRESS NOTE ADULT - PROVIDER SPECIALTY LIST ADULT
ENT
Endocrinology
Hospitalist
Hospitalist
NSICU
NSICU
Neurosurgery
NSICU
ENT
ENT

## 2019-10-01 ENCOUNTER — INBOUND DOCUMENT (OUTPATIENT)
Age: 59
End: 2019-10-01

## 2019-10-01 LAB — SURGICAL PATHOLOGY STUDY: SIGNIFICANT CHANGE UP

## 2019-10-02 PROBLEM — K57.92 DIVERTICULITIS OF INTESTINE, PART UNSPECIFIED, WITHOUT PERFORATION OR ABSCESS WITHOUT BLEEDING: Chronic | Status: ACTIVE | Noted: 2019-09-12

## 2019-10-02 PROBLEM — G47.30 SLEEP APNEA, UNSPECIFIED: Chronic | Status: ACTIVE | Noted: 2019-09-12

## 2019-10-02 PROBLEM — C61 MALIGNANT NEOPLASM OF PROSTATE: Chronic | Status: ACTIVE | Noted: 2019-09-12

## 2019-10-08 ENCOUNTER — APPOINTMENT (OUTPATIENT)
Dept: SPINE | Facility: CLINIC | Age: 59
End: 2019-10-08
Payer: COMMERCIAL

## 2019-10-08 VITALS
TEMPERATURE: 98.3 F | DIASTOLIC BLOOD PRESSURE: 70 MMHG | WEIGHT: 200 LBS | HEIGHT: 73 IN | RESPIRATION RATE: 16 BRPM | HEART RATE: 72 BPM | SYSTOLIC BLOOD PRESSURE: 109 MMHG | OXYGEN SATURATION: 100 % | BODY MASS INDEX: 26.51 KG/M2

## 2019-10-08 PROCEDURE — 99213 OFFICE O/P EST LOW 20 MIN: CPT

## 2019-10-08 RX ORDER — HYDROCORTISONE 5 MG/1
5 TABLET ORAL
Refills: 0 | Status: ACTIVE | COMMUNITY

## 2019-10-08 RX ORDER — HYDROCORTISONE 10 MG/1
10 TABLET ORAL EVERY MORNING
Refills: 0 | Status: ACTIVE | COMMUNITY

## 2019-10-08 NOTE — ASSESSMENT
[FreeTextEntry1] : The steri strips on the thigh incision was removed and the  Mylicon suture was clipped.  Wound care and activity levels were discussed.  The patient was advised to avoid bending and lifting heavy objects.  He is to return in 4 weeks for evaluation and is to return in 3 months with a new MRI of the brain and sella for review.

## 2019-10-08 NOTE — DATA REVIEWED
[de-identified] : Brain and sella with and without contrast from 9/27/2019 and 9/26/2019 done at [facility].

## 2019-10-08 NOTE — HISTORY OF PRESENT ILLNESS
[FreeTextEntry1] : Dimitris Velez is a 59 year old gentleman who was seen by his optometrist for a routine visit and was noted to have peripheral vision loss.  He was then seen by neuro-ophthalmologist, Dr. Munroe and was found to have a bitemporal field cut.  He than had an MRI which revealed a pituitary macroadenoma with suprasellar extension and compression of the optic chiasm.  He was seen by Dr. Linwood Posadas and was found to have a nonsecreting tumor.   The patient underwent an endoscopic transnasal transsphenoidal removal of a pituitary macroadenoma and harvest of a fat graft on 9/27/2019.  He stated that he has been having hot flashes and feels somewhat fatigued but is doing better with time.  He has an appointment with Dr. Romeo tomorrow and is to follow up with Dr. Posadas and Dr. Munroe in November.  He denies any evidence of CSF rhinorrhea.

## 2019-10-08 NOTE — REASON FOR VISIT
[de-identified] : Endoscopic transnasal transsphenoidal approach to the sella for removal of a pituitary macroadenoma and harvest of a fat graft. [de-identified] : 09/27/2019 [de-identified] : The patient is here for post op evaluation and a wound check.

## 2019-10-08 NOTE — CONSULT LETTER
[Dear  ___] : Dear  [unfilled], [Courtesy Letter:] : I had the pleasure of seeing your patient, [unfilled], in my office today. [Please see my note below.] : Please see my note below. [Consult Closing:] : Thank you very much for allowing me to participate in the care of this patient.  If you have any questions, please do not hesitate to contact me. [Sincerely,] : Sincerely, [FreeTextEntry3] : JOSE Chavez.\par Nurse Practitioner\par Neurosurgery and Spine Department\par [facility]\par  [DrBryon  ___] : Dr. BUTTS

## 2019-10-09 ENCOUNTER — APPOINTMENT (OUTPATIENT)
Dept: OTOLARYNGOLOGY | Facility: CLINIC | Age: 59
End: 2019-10-09
Payer: COMMERCIAL

## 2019-10-09 VITALS
BODY MASS INDEX: 26.51 KG/M2 | HEIGHT: 73 IN | HEART RATE: 66 BPM | WEIGHT: 200 LBS | SYSTOLIC BLOOD PRESSURE: 124 MMHG | DIASTOLIC BLOOD PRESSURE: 76 MMHG

## 2019-10-09 PROCEDURE — 31237 NSL/SINS NDSC SURG BX POLYPC: CPT | Mod: 50,58

## 2019-10-09 PROCEDURE — 99024 POSTOP FOLLOW-UP VISIT: CPT

## 2019-10-10 NOTE — HISTORY OF PRESENT ILLNESS
[de-identified] : S/p TSPR with Dr. Rodriguez 9/26/19\par Notes no nasal congestion, no bleeding, no watery d/c.  No change in vision.  Sense of taste is good, Sense of smell is slightly decreased.  No f/c/s.  No pain.  Was using sinus wash BID now down to daily. \par Mild frontal sinus pressure.

## 2019-10-10 NOTE — REASON FOR VISIT
[Initial Consultation] : an initial consultation for [FreeTextEntry2] : s/p TSPR with Dr. Rodriguez 9/26/19

## 2019-10-10 NOTE — PROCEDURE
[FreeTextEntry6] : Afrin and lidocaine were topically sprayed. Rigid scope #1 was used. Right nasal passage with crusting that was debrided with forceps. Sphenoidotomy patent with mucoid secretions and dissolving nasopore/gelfoam which were gently suctioned. Left nasal passage with crusting that was debrided with forceps. Left inferior turbinate Sphenoidotomy patent with minimal secretions and crusting along septectomy. No evidence of CSF leak. Nasopharynx clear.

## 2019-10-10 NOTE — PHYSICAL EXAM
[Normal] : external appearance is normal [de-identified] : b/l nasopore visible superiorly [de-identified] : left inferior turbinate to septum adhesion

## 2019-10-10 NOTE — ASSESSMENT
[FreeTextEntry1] : s/p TSPR:\par - debrided today \par - cont irrigation BID\par \par hx maxillary opacification:\par - on MRI day of surgery noted only to have b/l maxillary floor polypoid thickening so no antrostomy performed\par \par f/u 2-3 weeks\par \par

## 2019-10-10 NOTE — CONSULT LETTER
[Dear  ___] : Dear  [unfilled], [Consult Letter:] : I had the pleasure of evaluating your patient, [unfilled]. [Please see my note below.] : Please see my note below. [Consult Closing:] : Thank you very much for allowing me to participate in the care of this patient.  If you have any questions, please do not hesitate to contact me. [Sincerely,] : Sincerely, [FreeTextEntry1] : r [FreeTextEntry3] : Beulah Romeo MD\par Otolaryngology and Cranial Base Surgery\par Attending Physician - Department of Otolaryngology and Head & Neck Surgery\par [facility]\par  - [employer] [facility]\par Office: [phone]\par Fax: [phone]\par

## 2019-10-21 ENCOUNTER — APPOINTMENT (OUTPATIENT)
Dept: OTOLARYNGOLOGY | Facility: CLINIC | Age: 59
End: 2019-10-21
Payer: COMMERCIAL

## 2019-10-21 VITALS
DIASTOLIC BLOOD PRESSURE: 74 MMHG | HEIGHT: 73 IN | HEART RATE: 70 BPM | SYSTOLIC BLOOD PRESSURE: 118 MMHG | WEIGHT: 194 LBS | BODY MASS INDEX: 25.71 KG/M2

## 2019-10-21 PROCEDURE — 31231 NASAL ENDOSCOPY DX: CPT | Mod: 58

## 2019-10-21 PROCEDURE — 99024 POSTOP FOLLOW-UP VISIT: CPT

## 2019-10-21 NOTE — ASSESSMENT
[FreeTextEntry1] : s/p TSPR:\par - cont irrigation BID\par - if still with significant turbinate hypertrophy next visit will add nasonex\par \par hx maxillary opacification:\par - on MRI day of surgery noted only to have b/l maxillary floor polypoid thickening so no antrostomy performed\par - cont irrigations and will add nasonex next visit\par \par f/u 2-3 weeks\par \par

## 2019-10-21 NOTE — PROCEDURE
[FreeTextEntry6] : Afrin and lidocaine were topically sprayed. Peds flex scope #23 was used. Right nasal passage with inferior turbinate swelling but otherwise clear. Sphenoidotomy patent with mucoid secretions and thin layer of yellow uexudate. posterior sphenoid wall with debris. no csf leak. Left nasal passage with inferior turbinate swelling but otherwise patent. Sphenoidotomy patent with minimal secretions and crusting along septectomy. No evidence of CSF leak. Nasopharynx clear.

## 2019-10-21 NOTE — CONSULT LETTER
[Dear  ___] : Dear  [unfilled], [Consult Letter:] : I had the pleasure of evaluating your patient, [unfilled]. [Please see my note below.] : Please see my note below. [Consult Closing:] : Thank you very much for allowing me to participate in the care of this patient.  If you have any questions, please do not hesitate to contact me. [Sincerely,] : Sincerely, [FreeTextEntry3] : Beulah Romeo MD\par Otolaryngology and Cranial Base Surgery\par Attending Physician - Department of Otolaryngology and Head & Neck Surgery\par [facility]\par  - [employer] [facility]\par Office: [phone]\par Fax: [phone]\par

## 2019-11-08 ENCOUNTER — APPOINTMENT (OUTPATIENT)
Dept: OTOLARYNGOLOGY | Facility: CLINIC | Age: 59
End: 2019-11-08
Payer: COMMERCIAL

## 2019-11-08 VITALS
HEART RATE: 92 BPM | DIASTOLIC BLOOD PRESSURE: 72 MMHG | HEIGHT: 73 IN | BODY MASS INDEX: 25.84 KG/M2 | WEIGHT: 195 LBS | SYSTOLIC BLOOD PRESSURE: 125 MMHG

## 2019-11-08 PROCEDURE — 99024 POSTOP FOLLOW-UP VISIT: CPT

## 2019-11-08 PROCEDURE — 31231 NASAL ENDOSCOPY DX: CPT | Mod: 58

## 2019-11-08 NOTE — PROCEDURE
[FreeTextEntry6] : Afrin and lidocaine were topically sprayed. Peds flex scope #23 was used. Right nasal passage clear. Septectomy clear and sphenoidotomy patent with thin crust over the posterior sphenoid wall centrally. no csf leak. Left nasal passage clear. Septectomy clear and sphenoidotomy patent with thin crust over the posterior sphenoid wall centrally. Nasopharynx clear.

## 2019-11-08 NOTE — ASSESSMENT
[FreeTextEntry1] : s/p TSPR:\par - cont irrigation daily\par - nearly completely healed but will f/u in 2-3 weeks that posterior sphenoid wall completely remucosalizes\par \par hx maxillary opacification:\par - on MRI day of surgery noted only to have b/l maxillary floor polypoid thickening so no antrostomy performed\par - cont irrigations and will add nasonex next visit\par \par f/u 2-3 weeks\par \par

## 2019-11-08 NOTE — HISTORY OF PRESENT ILLNESS
[de-identified] : S/p TSPR with Dr. Rodriguez 9/26/19\par Nasal congestion is much improved, no bleeding, no watery d/c.  No change in vision.  Sense of taste is good, Sense of smell is still decreased but returning.  No f/c/s.  No pain.  Is using sinus wash daily\par continues to have some intermittent headaches.

## 2019-11-12 ENCOUNTER — APPOINTMENT (OUTPATIENT)
Dept: SPINE | Facility: CLINIC | Age: 59
End: 2019-11-12
Payer: COMMERCIAL

## 2019-11-12 VITALS
BODY MASS INDEX: 25.84 KG/M2 | DIASTOLIC BLOOD PRESSURE: 64 MMHG | SYSTOLIC BLOOD PRESSURE: 110 MMHG | WEIGHT: 195 LBS | TEMPERATURE: 98.1 F | RESPIRATION RATE: 14 BRPM | HEIGHT: 73 IN | HEART RATE: 68 BPM

## 2019-11-12 PROCEDURE — 99024 POSTOP FOLLOW-UP VISIT: CPT

## 2019-11-12 RX ORDER — DESMOPRESSIN ACETATE 0.1 MG/1
0.1 TABLET ORAL
Qty: 180 | Refills: 0 | Status: ACTIVE | COMMUNITY
Start: 2019-10-13

## 2019-11-12 RX ORDER — AZITHROMYCIN 500 MG/1
500 TABLET, FILM COATED ORAL
Qty: 8 | Refills: 0 | Status: COMPLETED | COMMUNITY
Start: 2019-07-19 | End: 2019-11-12

## 2019-11-12 RX ORDER — AMOXICILLIN AND CLAVULANATE POTASSIUM 875; 125 MG/1; MG/1
875-125 TABLET, COATED ORAL
Qty: 28 | Refills: 0 | Status: COMPLETED | COMMUNITY
Start: 2019-09-06

## 2019-11-12 RX ORDER — TAMSULOSIN HYDROCHLORIDE 0.4 MG/1
0.4 CAPSULE ORAL
Qty: 90 | Refills: 0 | Status: ACTIVE | COMMUNITY
Start: 2019-10-29

## 2019-11-12 RX ORDER — TADALAFIL 20 MG/1
20 TABLET ORAL
Qty: 18 | Refills: 0 | Status: ACTIVE | COMMUNITY
Start: 2019-07-18

## 2019-12-06 ENCOUNTER — APPOINTMENT (OUTPATIENT)
Dept: OTOLARYNGOLOGY | Facility: CLINIC | Age: 59
End: 2019-12-06
Payer: COMMERCIAL

## 2019-12-06 VITALS
HEART RATE: 70 BPM | HEIGHT: 73 IN | BODY MASS INDEX: 26.24 KG/M2 | DIASTOLIC BLOOD PRESSURE: 89 MMHG | SYSTOLIC BLOOD PRESSURE: 117 MMHG | WEIGHT: 198 LBS

## 2019-12-06 PROCEDURE — 31231 NASAL ENDOSCOPY DX: CPT | Mod: 58

## 2019-12-06 PROCEDURE — 99024 POSTOP FOLLOW-UP VISIT: CPT

## 2019-12-10 NOTE — ASSESSMENT
[FreeTextEntry1] : s/p TSPR:\par - cont irrigation daily\par - nearly completely healed but will f/u in 4-6 weeks that posterior sphenoid wall completely remucosalizes\par \par hx maxillary opacification:\par - on MRI day of surgery noted only to have b/l maxillary floor polypoid thickening so no antrostomy performed\par - cont nasonex BID\par \par f/u 4-6 weeks\par \par

## 2019-12-10 NOTE — CONSULT LETTER
[Dear  ___] : Dear  [unfilled], [Consult Letter:] : I had the pleasure of evaluating your patient, [unfilled]. [Consult Closing:] : Thank you very much for allowing me to participate in the care of this patient.  If you have any questions, please do not hesitate to contact me. [Please see my note below.] : Please see my note below. [Sincerely,] : Sincerely, [FreeTextEntry3] : Beulah Romeo MD\par Otolaryngology and Cranial Base Surgery\par Attending Physician - Department of Otolaryngology and Head & Neck Surgery\par [facility]\par  - [employer] at [facility]\par Office: [phone]\par Fax: [phone]\par

## 2019-12-10 NOTE — PROCEDURE
[FreeTextEntry6] : Afrin and lidocaine were topically sprayed. Peds flex scope #23 was used. Right nasal passage clear. Septectomy clear and sphenoidotomy patent with thin crust over the posterior sphenoid wall centrally. no csf leak. Left nasal passage clear. Septectomy and sphenoidotomy with moderate crust. Nasopharynx clear.

## 2020-01-13 ENCOUNTER — APPOINTMENT (OUTPATIENT)
Dept: SPINE | Facility: CLINIC | Age: 60
End: 2020-01-13
Payer: COMMERCIAL

## 2020-01-13 VITALS
DIASTOLIC BLOOD PRESSURE: 71 MMHG | HEIGHT: 73 IN | WEIGHT: 212 LBS | TEMPERATURE: 98.1 F | BODY MASS INDEX: 28.1 KG/M2 | HEART RATE: 70 BPM | OXYGEN SATURATION: 100 % | SYSTOLIC BLOOD PRESSURE: 121 MMHG

## 2020-01-13 PROCEDURE — 99213 OFFICE O/P EST LOW 20 MIN: CPT

## 2020-01-13 RX ORDER — TRAMADOL HYDROCHLORIDE 50 MG/1
50 TABLET, COATED ORAL
Refills: 0 | Status: DISCONTINUED | COMMUNITY
End: 2020-01-13

## 2020-01-13 RX ORDER — GLUCOSAMINE HCL/CHONDROITIN SU 500-400 MG
3 CAPSULE ORAL
Refills: 0 | Status: ACTIVE | COMMUNITY

## 2020-01-13 RX ORDER — ACETAMINOPHEN 325 MG/1
325 TABLET ORAL
Refills: 0 | Status: DISCONTINUED | COMMUNITY
End: 2020-01-13

## 2020-01-13 RX ORDER — IBUPROFEN 200 MG/1
TABLET, COATED ORAL
Refills: 0 | Status: ACTIVE | COMMUNITY

## 2020-01-13 RX ORDER — TESTOSTERONE 2 MG/D
2 PATCH TRANSDERMAL
Refills: 0 | Status: ACTIVE | COMMUNITY

## 2020-02-21 ENCOUNTER — APPOINTMENT (OUTPATIENT)
Dept: OTOLARYNGOLOGY | Facility: CLINIC | Age: 60
End: 2020-02-21

## 2020-04-28 NOTE — CONSULT NOTE ADULT - REASON FOR ADMISSION
Problem: At Risk for Injury Due to Fall  Goal: # Patient does not fall  Outcome: Outcome Met, Continue evaluating goal progress toward completion  Bed alarm activated in zone 2. Call light and phone within reach.       resection of Pit Adenoma

## 2020-06-27 NOTE — CONSULT NOTE ADULT - PROVIDER SPECIALTY LIST ADULT
Endocrinology (1) Mild-to-moderate dysarthria; patient slurs at least some words and, at worst, can be understood with some difficulty

## 2020-09-21 ENCOUNTER — APPOINTMENT (OUTPATIENT)
Dept: SPINE | Facility: CLINIC | Age: 60
End: 2020-09-21
Payer: COMMERCIAL

## 2020-09-21 VITALS
BODY MASS INDEX: 28.49 KG/M2 | HEART RATE: 70 BPM | SYSTOLIC BLOOD PRESSURE: 126 MMHG | TEMPERATURE: 97.7 F | OXYGEN SATURATION: 95 % | HEIGHT: 73 IN | WEIGHT: 215 LBS | DIASTOLIC BLOOD PRESSURE: 75 MMHG

## 2020-09-21 PROCEDURE — 99213 OFFICE O/P EST LOW 20 MIN: CPT

## 2020-09-22 NOTE — ASSESSMENT
[FreeTextEntry1] : These images and findings were reviewed and discussed with the patient. It was recommended that he return in one year with a new MRI of the brain and sella with and without contrast for surveillance. He is also to continue his endocrine followup with Dr. Linwood Posadas M.D.

## 2020-09-22 NOTE — RESULTS/DATA
[FreeTextEntry1] : The MRI shows a that there has been an interval transsphenoidal resection of a large heterogeneously enhancing sellar suprasellar mass with postsurgical changes.. There is no evidence of tumor or tumor recurrence.

## 2020-09-22 NOTE — REASON FOR VISIT
[Follow-Up: _____] : a [unfilled] follow-up visit [FreeTextEntry1] : JOE SNYDER is a 60 year old gentleman who underwent an endoscopic transnasal transsphenoidal approach to the sella for removal of a pituitary macroadenoma and harvested a fat graft on 9/27/2019. The patient remains on hydrocortisone and does and is on desmopressin for diabetes insipidus. He is also on a testosterone patch.. He stated that he is doing well. He is here for a review of the new MRI of the brain and sella with and without contrast.\par

## 2020-11-13 NOTE — PROGRESS NOTE ADULT - PROBLEM/PLAN-1
DISPLAY PLAN FREE TEXT
72.1

## 2020-12-18 ENCOUNTER — APPOINTMENT (OUTPATIENT)
Dept: OTOLARYNGOLOGY | Facility: CLINIC | Age: 60
End: 2020-12-18
Payer: COMMERCIAL

## 2020-12-18 VITALS
SYSTOLIC BLOOD PRESSURE: 127 MMHG | TEMPERATURE: 97.7 F | BODY MASS INDEX: 29.16 KG/M2 | DIASTOLIC BLOOD PRESSURE: 77 MMHG | HEIGHT: 73 IN | HEART RATE: 65 BPM | WEIGHT: 220 LBS

## 2020-12-18 PROCEDURE — 31237 NSL/SINS NDSC SURG BX POLYPC: CPT | Mod: 50

## 2020-12-18 PROCEDURE — 99072 ADDL SUPL MATRL&STAF TM PHE: CPT

## 2020-12-18 PROCEDURE — 99213 OFFICE O/P EST LOW 20 MIN: CPT | Mod: 25

## 2020-12-18 NOTE — HISTORY OF PRESENT ILLNESS
[de-identified] : S/p TSPR with Dr. Rodriguez 9/26/19\par Nasal congestion continues. no bleeding, no watery d/c.  No change in vision.  Sense of taste is good, Sense of smell is okay.  No f/c/s.  No pain.  \par was using Nasonex, now off it for the past few weeks and thinks congestion improved a bit now that he is off it.  no No longer using sinus wash. \par Headaches had improved, however not starting to come back over the past month.

## 2020-12-18 NOTE — PROCEDURE
[FreeTextEntry6] : Peds flex scope #23 and rigid scope #R8 was used. Right nasal passage clear. Septectomy clear and sphenoidotomy patent with crust over the posterior sphenoid wall centrally. no csf leak. Left nasal passage clear. Septectomy and sphenoidotomy with large crust, removed today with bilateral debridement.  Nasopharynx clear.

## 2020-12-18 NOTE — REASON FOR VISIT
[Subsequent Evaluation] : a subsequent evaluation for [FreeTextEntry2] : s/p TSPR with Dr. Rodriguez 9/26/19

## 2020-12-18 NOTE — CONSULT LETTER
[Dear  ___] : Dear  [unfilled], [Consult Letter:] : I had the pleasure of evaluating your patient, [unfilled]. [Please see my note below.] : Please see my note below. [Consult Closing:] : Thank you very much for allowing me to participate in the care of this patient.  If you have any questions, please do not hesitate to contact me. [Sincerely,] : Sincerely, [FreeTextEntry3] : Beulah Romeo MD\par Otolaryngology and Cranial Base Surgery\par Attending Physician - Department of Otolaryngology and Head & Neck Surgery\par [facility]\par  - [employer] at [facility]\par Office: [phone]\par Fax: [phone]\par

## 2020-12-18 NOTE — ASSESSMENT
[FreeTextEntry1] : s/p TSPR:\par - Debrided in office today\par - resume irrigation daily\par \par hx maxillary opacification:\par - on MRI day of surgery noted only to have b/l maxillary floor polypoid thickening so no antrostomy performed\par - last MRI noted several max polypoid areas but nonobstructive and no air fluid levels\par - resume nasonex BID\par \par f/u 2 months\par \par

## 2021-09-27 ENCOUNTER — APPOINTMENT (OUTPATIENT)
Dept: SPINE | Facility: CLINIC | Age: 61
End: 2021-09-27

## 2021-10-01 NOTE — PRE-ANESTHESIA EVALUATION ADULT - NSANTHAPLANRD_GEN_ALL_CORE
Surgeon:         Dr. Aletha Diop                                        Tel:         [phone]                                  Fax:        [phone]    Surgery/Procedure:  Immediate breast reconstruction with placement of bilateral tissue expanders general

## 2021-11-22 NOTE — PROGRESS NOTE ADULT - PROBLEM SELECTOR PLAN 4
BP currently at goal.   - continue home amlodipine 5mg and losartan 100mg daily
Diet: regular  DVT prophylaxis: lovenox   Dispo: home
BP currently at goal.   - continue home amlodipine 5mg and losartan 100mg daily

## 2021-12-02 NOTE — CONSULT NOTE ADULT - PROBLEM SELECTOR RECOMMENDATION 5
[Joint Pain] : joint pain [Negative] : Allergic/Immunologic -c/w senna, colace. can trial miralax or lactulose dose if pt still constipated.

## 2022-04-29 ENCOUNTER — APPOINTMENT (OUTPATIENT)
Dept: OTOLARYNGOLOGY | Facility: CLINIC | Age: 62
End: 2022-04-29
Payer: COMMERCIAL

## 2022-04-29 VITALS
SYSTOLIC BLOOD PRESSURE: 122 MMHG | HEART RATE: 70 BPM | TEMPERATURE: 97.6 F | WEIGHT: 228 LBS | HEIGHT: 73 IN | DIASTOLIC BLOOD PRESSURE: 73 MMHG | BODY MASS INDEX: 30.22 KG/M2

## 2022-04-29 DIAGNOSIS — K21.9 GASTRO-ESOPHAGEAL REFLUX DISEASE W/OUT ESOPHAGITIS: ICD-10-CM

## 2022-04-29 DIAGNOSIS — D35.2 BENIGN NEOPLASM OF PITUITARY GLAND: ICD-10-CM

## 2022-04-29 PROCEDURE — 99214 OFFICE O/P EST MOD 30 MIN: CPT | Mod: 25

## 2022-04-29 PROCEDURE — 31231 NASAL ENDOSCOPY DX: CPT

## 2022-04-29 NOTE — CONSULT LETTER
[Dear  ___] : Dear  [unfilled], [Consult Letter:] : I had the pleasure of evaluating your patient, [unfilled]. [Please see my note below.] : Please see my note below. [Consult Closing:] : Thank you very much for allowing me to participate in the care of this patient.  If you have any questions, please do not hesitate to contact me. [Sincerely,] : Sincerely, [FreeTextEntry3] : Beulah Romeo MD\par Otolaryngology and Cranial Base Surgery\par Attending Physician - Department of Otolaryngology and Head & Neck Surgery\par [facility]\par  - [employer] at [facility]\par Office: [phone]\par Fax: [phone]\par

## 2022-04-29 NOTE — HISTORY OF PRESENT ILLNESS
[de-identified] : S/p TSPR with Dr. Rodriguez 9/26/19\par He uses generic nasonex once spray daily and takes allegra-D daily. He currently has feeling of sinus headaches and frequently feels the sinuses are irritated. He feels the right ear is hurting also. No hearing loss or tinnitus.

## 2022-04-29 NOTE — PROCEDURE
[FreeTextEntry6] : Peds flex scope #23 and rigid scope #R7 was used. afrin and lidocaine cotton was used for decongestion/anesthesia. Right nasal passage clear. Septectomy with large crust occluding sphenoidotomy. no csf leak. Left nasal passage clear. Septectomy and sphenoidotomy with large crust, attempted debridement but pt could not tolerate. Nasopharynx clear.

## 2022-04-29 NOTE — ASSESSMENT
[FreeTextEntry1] : s/p TSPR:\par - attempted debridement but could not tolerate\par - resume irrigation daily\par \par hx maxillary opacification:\par - on MRI day of surgery noted only to have b/l maxillary floor polypoid thickening so no antrostomy performed\par - last MRI noted several max polypoid areas but nonobstructive and no air fluid levels\par - resume nasonex 2 sprays daily\par \par f/u 1 month to see if crusting cleared\par \par

## 2022-06-10 ENCOUNTER — APPOINTMENT (OUTPATIENT)
Dept: OTOLARYNGOLOGY | Facility: CLINIC | Age: 62
End: 2022-06-10
Payer: COMMERCIAL

## 2022-06-10 VITALS
WEIGHT: 230 LBS | HEIGHT: 73 IN | SYSTOLIC BLOOD PRESSURE: 141 MMHG | TEMPERATURE: 97.1 F | HEART RATE: 81 BPM | BODY MASS INDEX: 30.48 KG/M2 | DIASTOLIC BLOOD PRESSURE: 77 MMHG

## 2022-06-10 DIAGNOSIS — J32.0 CHRONIC MAXILLARY SINUSITIS: ICD-10-CM

## 2022-06-10 DIAGNOSIS — Z86.018 OTHER SPECIFIED POSTPROCEDURAL STATES: ICD-10-CM

## 2022-06-10 DIAGNOSIS — Z98.890 OTHER SPECIFIED POSTPROCEDURAL STATES: ICD-10-CM

## 2022-06-10 DIAGNOSIS — R09.81 NASAL CONGESTION: ICD-10-CM

## 2022-06-10 PROCEDURE — 99213 OFFICE O/P EST LOW 20 MIN: CPT | Mod: 25

## 2022-06-10 PROCEDURE — 31231 NASAL ENDOSCOPY DX: CPT

## 2022-06-10 NOTE — ASSESSMENT
[FreeTextEntry1] : s/p TSPR:\par - Continue sinus wash for another month daily.  Then can start tapering off. \par - F/U with Dr. Rodriguez as he did not f/u after his last MRI\par \par hx bilateral max polyps noted on MRI:\par - Continue Nasonex. \par \par HTN\par - F/U with PMD\par \par \par I personally saw and examined Mr. JOE SNYDER in detail this visit today. I personally reviewed the HPI, PMH, FH, SH, ROS and medications/allergies. I have spoken to JAVIER Gordon regarding the history and have personally determined the assessment and plan of care, and documented this myself. I was present and participated in all key portions of the encounter and all procedures noted above. I have made changes in the body of the note where appropriate.\par \par Attesting Faculty: See Attending Signature Below \par \par \par

## 2022-06-10 NOTE — HISTORY OF PRESENT ILLNESS
[de-identified] : S/p TSPR with Dr. Rodriguez 9/26/19\par He uses generic nasonex once spray daily and takes allegra-D daily. no nasal congestion and no longer with sinus HA. No longer with Right ear pain.  hearing is good.  Doing sinus wash 4-5 times a week

## 2022-06-10 NOTE — CONSULT LETTER
[Dear  ___] : Dear  [unfilled], [Consult Letter:] : I had the pleasure of evaluating your patient, [unfilled]. [Please see my note below.] : Please see my note below. [Consult Closing:] : Thank you very much for allowing me to participate in the care of this patient.  If you have any questions, please do not hesitate to contact me. [Sincerely,] : Sincerely, [FreeTextEntry3] : Beulah Romeo MD\par Otolaryngology and Cranial Base Surgery\par Attending Physician - Department of Otolaryngology and Head & Neck Surgery\par [facility]\par  - [employer] at [facility]\par Office: [phone]\par Fax: [phone]\par

## 2023-06-09 NOTE — HISTORY OF PRESENT ILLNESS
[de-identified] : S/p TSPR with Dr. Rodriguez 9/26/19\par Nasal congestion is much improved, however feels he may be getting a cold, no bleeding, no watery d/c.  No change in vision.  Sense of taste is good, Sense of smell is okay.  No f/c/s.  No pain.  Is using sinus wash daily.\par Started using Nasonex one week ago. \par Headaches are much improved oral

## 2024-02-06 NOTE — PHYSICAL THERAPY INITIAL EVALUATION ADULT - LEVEL OF INDEPENDENCE: SIT/STAND, REHAB EVAL
"  Assessment & Plan   Problem List Items Addressed This Visit          Circulatory    Hypertension goal BP (blood pressure) < 140/90 - Primary    Relevant Medications    lisinopril (ZESTRIL) 20 MG tablet    Other Relevant Orders    Albumin Random Urine Quantitative with Creat Ratio    Basic metabolic panel  (Ca, Cl, CO2, Creat, Gluc, K, Na, BUN) (Completed)       Other    CARDIOVASCULAR SCREENING; LDL GOAL LESS THAN 160     Other Visit Diagnoses       Screen for colon cancer        Relevant Orders    Colonoscopy Screening  Referral    Lipid screening        Relevant Orders    Lipid panel reflex to direct LDL Non-fasting (Completed)           Plan to increase lisinopril to 20 mg daily.  He will monitor home blood pressures and let me know if not at goal.  Importance of diet and exercise with low-sodium diet discussed.  He has no symptoms of sleep apnea at this time.  Repeat lipids and BMP today.  Patient due for colon cancer screening willing to do colonoscopy now.  No immunizations 1 by him today.  Follow-up in 1 year.  Sooner if new or worsening issues arise.    BMI  Estimated body mass index is 27.99 kg/m  as calculated from the following:    Height as of this encounter: 1.803 m (5' 11\").    Weight as of this encounter: 91 kg (200 lb 11.2 oz).   Weight management plan: Discussed healthy diet and exercise guidelines      Mandy Mcrae is a 45 year old, presenting for the following health issues:  RECHECK        2/6/2024     3:18 PM   Additional Questions   Roomed by Ruddy Allen CMA     History of Present Illness       Hypertension: He presents for follow up of hypertension.  He does not check blood pressure  regularly outside of the clinic. Outside blood pressures have been over 140/90. He follows a low salt diet.     He eats 2-3 servings of fruits and vegetables daily.He consumes 4 sweetened beverage(s) daily.He exercises with enough effort to increase his heart rate 60 or more minutes per day.  He " "exercises with enough effort to increase his heart rate 7 days per week.   He is taking medications regularly.     Patient increase lisinopril at home by himself and did seem to have improvement in pressures which were high previously.  Does note pressure in his head when blood pressures did high.  No other chest pain palpitations or respiratory symptoms.    Review of Systems  Constitutional, HEENT, cardiovascular, pulmonary, GI, , musculoskeletal, neuro, skin, endocrine and psych systems are negative, except as otherwise noted.      Objective    /88 (BP Location: Right arm, Patient Position: Chair, Cuff Size: Adult Large)   Pulse 68   Temp 97.9  F (36.6  C) (Temporal)   Resp 12   Ht 1.803 m (5' 11\")   Wt 91 kg (200 lb 11.2 oz)   SpO2 99%   BMI 27.99 kg/m    Body mass index is 27.99 kg/m .  Physical Exam   GENERAL: alert and no distress  EYES: Eyes grossly normal to inspection, PERRL and conjunctivae and sclerae normal  HENT: ear canals and TM's normal, nose and mouth without ulcers or lesions  NECK: no adenopathy, no asymmetry, masses, or scars  RESP: lungs clear to auscultation - no rales, rhonchi or wheezes  CV: regular rate and rhythm, normal S1 S2, no S3 or S4, no murmur, click or rub, no peripheral edema  ABDOMEN: soft, nontender, no hepatosplenomegaly, no masses and bowel sounds normal  MS: no gross musculoskeletal defects noted, no edema  SKIN: no suspicious lesions or rashes  NEURO: Normal strength and tone, mentation intact and speech normal  PSYCH: mentation appears normal, affect normal/bright          Signed Electronically by: Kurtis Costello MD    " contact guard

## 2024-12-17 NOTE — CONSULT LETTER
[Dear  ___] : Dear  [unfilled], [Please see my note below.] : Please see my note below. [Consult Letter:] : I had the pleasure of evaluating your patient, [unfilled]. [Consult Closing:] : Thank you very much for allowing me to participate in the care of this patient.  If you have any questions, please do not hesitate to contact me. [Sincerely,] : Sincerely, [FreeTextEntry3] : Beulah Romeo MD\par Otolaryngology and Cranial Base Surgery\par Attending Physician - Department of Otolaryngology and Head & Neck Surgery\par [facility]\par  - [employer] at [facility]\par Office: [phone]\par Fax: [phone]\par  No assistance needed

## 2025-04-02 NOTE — H&P PST ADULT - LAST ECHOCARDIOGRAM
Has Your Skin Lesion Been Treated?: not been treated
Is This A New Presentation, Or A Follow-Up?: Skin Lesion
denies

## 2025-05-30 NOTE — HISTORY OF PRESENT ILLNESS
[de-identified] : S/p TSPR with Dr. Rodriguez 9/26/19\par Notes improving nasal congestion, no bleeding, no watery d/c.  No change in vision.  Sense of taste is good, Sense of smell is still decreased.  No f/c/s.  No pain.  Is using sinus wash BID.\par Mild frontal sinus pressure as well as b/l ear discomfort.  No change in hearing, no tinnitus or vertigo.  show